# Patient Record
Sex: MALE | Race: WHITE | NOT HISPANIC OR LATINO | Employment: OTHER | URBAN - METROPOLITAN AREA
[De-identification: names, ages, dates, MRNs, and addresses within clinical notes are randomized per-mention and may not be internally consistent; named-entity substitution may affect disease eponyms.]

---

## 2018-02-12 ENCOUNTER — HOSPITAL ENCOUNTER (OUTPATIENT)
Dept: GENERAL RADIOLOGY | Facility: HOSPITAL | Age: 63
Discharge: HOME | End: 2018-02-12
Attending: INTERNAL MEDICINE | Admitting: INTERNAL MEDICINE

## 2018-02-12 ENCOUNTER — TRANSCRIBE ORDERS (OUTPATIENT)
Dept: ADMINISTRATIVE | Facility: HOSPITAL | Age: 63
End: 2018-02-12

## 2018-02-12 DIAGNOSIS — M25.551 RIGHT HIP PAIN: ICD-10-CM

## 2018-02-12 DIAGNOSIS — M25.551 RIGHT HIP PAIN: Primary | ICD-10-CM

## 2018-02-12 PROCEDURE — 73502 X-RAY EXAM HIP UNI 2-3 VIEWS: CPT

## 2020-02-05 PROBLEM — Z96.641 STATUS POST RIGHT HIP REPLACEMENT: Status: ACTIVE | Noted: 2018-03-28

## 2020-02-05 PROBLEM — M16.11 PRIMARY OSTEOARTHRITIS OF RIGHT HIP: Status: ACTIVE | Noted: 2018-03-16

## 2020-07-07 ENCOUNTER — HOSPITAL ENCOUNTER (OUTPATIENT)
Dept: GENERAL RADIOLOGY | Facility: HOSPITAL | Age: 65
Discharge: HOME | End: 2020-07-07
Admitting: UROLOGY

## 2020-07-07 ENCOUNTER — TRANSCRIBE ORDERS (OUTPATIENT)
Dept: ADMINISTRATIVE | Facility: HOSPITAL | Age: 65
End: 2020-07-07

## 2020-07-07 DIAGNOSIS — N13.9 URINARY OBSTRUCTION: ICD-10-CM

## 2020-07-07 DIAGNOSIS — N13.9 URINARY OBSTRUCTION: Primary | ICD-10-CM

## 2020-07-07 PROCEDURE — 71046 X-RAY EXAM CHEST 2 VIEWS: CPT

## 2021-01-13 ENCOUNTER — TELEPHONE (OUTPATIENT)
Dept: INTERNAL MEDICINE | Facility: CLINIC | Age: 66
End: 2021-01-13

## 2021-01-13 NOTE — TELEPHONE ENCOUNTER
PATIENT IS IMMUNE COMPROMISED AND WOULD LIKE TO KNOW IF HE NEEDS A NOTE TO RECEIVE THE COVID VACCINE @ [facility]?    PLEASE ADVISE PATIENT @  [phone]

## 2021-03-05 RX ORDER — SILDENAFIL CITRATE 20 MG/1
TABLET ORAL
Qty: 90 TABLET | Refills: 4 | Status: SHIPPED | OUTPATIENT
Start: 2021-03-05 | End: 2022-04-26 | Stop reason: SDUPTHER

## 2021-08-09 ENCOUNTER — TELEPHONE (OUTPATIENT)
Dept: INTERNAL MEDICINE | Facility: CLINIC | Age: 66
End: 2021-08-09

## 2021-08-09 NOTE — TELEPHONE ENCOUNTER
Caller: Roney Trinidad    Relationship: Self    Best call back number:  [phone] (M)    Medication needed:   Requested Prescriptions      No prescriptions requested or ordered in this encounter    LORAZEPAM    When do you need the refill by: 08/09/21    What additional details did the patient provide when requesting the medication: PATIENT STATE SWILL BE TRAVELING    PATIENT ASLO STATES WANTS TO VERIFY IF TEST RESULTS FROM DR VERDUGO HAS BEEN SENT OVER   Does the patient have less than a 3 day supply:  [x] Yes  [] No    What is the patient's preferred pharmacy: [id number] [place], [place] - [address] [address] AT [facility] [address] - [phone] ph - [phone] FX

## 2021-08-16 ENCOUNTER — HOSPITAL ENCOUNTER (OUTPATIENT)
Dept: GENERAL RADIOLOGY | Facility: HOSPITAL | Age: 66
Discharge: HOME | End: 2021-08-16
Admitting: UROLOGY

## 2021-08-16 ENCOUNTER — TRANSCRIBE ORDERS (OUTPATIENT)
Dept: ADMINISTRATIVE | Facility: HOSPITAL | Age: 66
End: 2021-08-16

## 2021-08-16 DIAGNOSIS — R31.0 GROSS HEMATURIA: ICD-10-CM

## 2021-08-16 DIAGNOSIS — R31.29 MICROSCOPIC HEMATURIA: ICD-10-CM

## 2021-08-16 DIAGNOSIS — R31.0 GROSS HEMATURIA: Primary | ICD-10-CM

## 2021-08-16 PROCEDURE — 74018 RADEX ABDOMEN 1 VIEW: CPT

## 2021-08-25 ENCOUNTER — OFFICE VISIT (OUTPATIENT)
Dept: INTERNAL MEDICINE | Facility: CLINIC | Age: 66
End: 2021-08-25

## 2021-08-25 VITALS
HEART RATE: 67 BPM | DIASTOLIC BLOOD PRESSURE: 80 MMHG | OXYGEN SATURATION: 98 % | RESPIRATION RATE: 16 BRPM | TEMPERATURE: 97.7 F | WEIGHT: 195 LBS | SYSTOLIC BLOOD PRESSURE: 142 MMHG

## 2021-08-25 DIAGNOSIS — F41.8 SITUATIONAL ANXIETY: Primary | ICD-10-CM

## 2021-08-25 DIAGNOSIS — E78.2 MIXED HYPERLIPIDEMIA: ICD-10-CM

## 2021-08-25 DIAGNOSIS — C62.90 SEMINOMA (HCC): ICD-10-CM

## 2021-08-25 PROCEDURE — 99214 OFFICE O/P EST MOD 30 MIN: CPT | Performed by: INTERNAL MEDICINE

## 2021-08-25 RX ORDER — IBUPROFEN 800 MG/1
800 TABLET ORAL
COMMUNITY
End: 2022-10-21

## 2021-08-25 RX ORDER — ASPIRIN 81 MG/1
81 TABLET ORAL DAILY
COMMUNITY

## 2021-08-25 RX ORDER — TESTOSTERONE 12.5 MG/1.25G
GEL TOPICAL DAILY
COMMUNITY
End: 2022-07-22

## 2021-08-25 RX ORDER — LORAZEPAM 0.5 MG/1
0.5 TABLET ORAL EVERY 8 HOURS PRN
Qty: 30 TABLET | Refills: 0 | Status: SHIPPED | OUTPATIENT
Start: 2021-08-25 | End: 2022-07-21 | Stop reason: SDUPTHER

## 2021-08-25 RX ORDER — CHLORAL HYDRATE 500 MG
CAPSULE ORAL
COMMUNITY
End: 2022-10-21

## 2021-08-25 RX ORDER — FEXOFENADINE HCL AND PSEUDOEPHEDRINE HCI 180; 240 MG/1; MG/1
1 TABLET, EXTENDED RELEASE ORAL DAILY
COMMUNITY

## 2021-08-25 RX ORDER — MELOXICAM 15 MG/1
15 TABLET ORAL DAILY
COMMUNITY
End: 2022-10-21 | Stop reason: SDUPTHER

## 2021-08-25 NOTE — PROGRESS NOTES
Chief Complaint  Hyperlipidemia and Hypertension    Jovon Trinidad presents to [facility]  History of Present Illness  Opioids doing well.  He saw urology in started talking about injections of testosterone for low testosterone level.  He has been on topical for quite a while.  He has been less active is gained 30 pounds over Covid and not exercising as much.  He has been off his diet.  Overall he is traveling more retired, he came back here originally for Ativan refill for situational anxiety particularly flying  Objective   Vital Signs:   /80 (BP Location: Left arm, Patient Position: Sitting, Cuff Size: Large Adult)   Pulse 67   Temp 97.7 °F (36.5 °C) (Temporal)   Resp 16   Wt 88.5 kg (195 lb)   SpO2 98%     Physical Exam  Constitutional:       Appearance: Normal appearance.   HENT:      Head: Normocephalic and atraumatic.      Nose: Nose normal.   Eyes:      Pupils: Pupils are equal, round, and reactive to light.   Cardiovascular:      Rate and Rhythm: Normal rate and regular rhythm.      Heart sounds: No murmur heard.   No friction rub. No gallop.    Pulmonary:      Effort: Pulmonary effort is normal.      Breath sounds: Normal breath sounds.   Neurological:      General: No focal deficit present.      Mental Status: He is alert and oriented to person, place, and time.   Psychiatric:         Attention and Perception: He does not perceive auditory or visual hallucinations.         Mood and Affect: Mood normal.         Speech: Speech normal.         Behavior: Behavior normal.         Thought Content: Thought content normal.         Cognition and Memory: Cognition normal.         Judgment: Judgment normal.        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  I think I be a little reluctant to do IM testosterone replacement just  due to his history but it sounds they are moving him in that direction.  I would probably think twice about that and maybe talk to him a little further about it.  He is coming in for a physical next month or 2 so we can check the labs then.  I strongly encouraged plant-based or paleo diet, start exercising particularly weight-based exercising and see if that helps too.  His situational anxiety, refilled his Ativan which he uses very sparingly.  No concerns for any misuse    Follow Up   Return in about 2 months (around 10/25/2021) for Annual physical.  Patient was given instructions and counseling regarding his condition or for health maintenance advice. Please see specific information pulled into the AVS if appropriate.

## 2021-10-04 ENCOUNTER — OFFICE VISIT (OUTPATIENT)
Dept: INTERNAL MEDICINE | Facility: CLINIC | Age: 66
End: 2021-10-04

## 2021-10-04 VITALS
BODY MASS INDEX: 32.32 KG/M2 | OXYGEN SATURATION: 97 % | HEART RATE: 63 BPM | RESPIRATION RATE: 16 BRPM | SYSTOLIC BLOOD PRESSURE: 158 MMHG | TEMPERATURE: 97 F | WEIGHT: 194 LBS | HEIGHT: 65 IN | DIASTOLIC BLOOD PRESSURE: 88 MMHG

## 2021-10-04 DIAGNOSIS — F41.8 SITUATIONAL ANXIETY: ICD-10-CM

## 2021-10-04 DIAGNOSIS — Z23 ENCOUNTER FOR IMMUNIZATION: ICD-10-CM

## 2021-10-04 DIAGNOSIS — Z00.00 ROUTINE GENERAL MEDICAL EXAMINATION AT A HEALTH CARE FACILITY: Primary | ICD-10-CM

## 2021-10-04 DIAGNOSIS — I10 ESSENTIAL (PRIMARY) HYPERTENSION: ICD-10-CM

## 2021-10-04 PROCEDURE — 90471 IMMUNIZATION ADMIN: CPT | Performed by: INTERNAL MEDICINE

## 2021-10-04 PROCEDURE — 90686 IIV4 VACC NO PRSV 0.5 ML IM: CPT | Performed by: INTERNAL MEDICINE

## 2021-10-04 PROCEDURE — 99396 PREV VISIT EST AGE 40-64: CPT | Performed by: INTERNAL MEDICINE

## 2021-10-04 RX ORDER — TESTOSTERONE CYPIONATE 200 MG/ML
INJECTION, SOLUTION INTRAMUSCULAR
COMMUNITY
Start: 2021-08-17

## 2021-10-04 RX ORDER — SYRINGE WITH NEEDLE, 1 ML 25GX5/8"
SYRINGE, EMPTY DISPOSABLE MISCELLANEOUS
COMMUNITY
Start: 2021-08-18

## 2021-10-04 NOTE — PROGRESS NOTES
"Chief Complaint   Patient presents with   • Annual Exam       Subjective   Roney Trinidad is a 66 y.o. male here for   Chief Complaint   Patient presents with   • Annual Exam   .    History of Present Illness   Here for the physical today.  Overall doing very well.  No specific concerns.  We talked about diet and exercise habits.  Discussed prevention recommendations.  The following portions of the patient's history were reviewed and updated as appropriate: allergies, current medications, past family history, past medical history, past social history, past surgical history, and problem list.    Review of Systems   Constitutional: Negative.    HENT: Negative for congestion, drooling, facial swelling, hearing loss, mouth sores, rhinorrhea, sinus pressure, sore throat, swollen glands, tinnitus and trouble swallowing.    Eyes: Negative.    Respiratory: Negative.    Cardiovascular: Negative.    Gastrointestinal: Negative.    Endocrine: Negative.    Genitourinary: Negative for difficulty urinating, dysuria, flank pain, frequency, hematuria and urgency.   Musculoskeletal: Negative for arthralgias and myalgias.   Skin: Negative for color change, dry skin, rash and bruise.   Neurological: Negative for dizziness, tremors, weakness, headache, memory problem and confusion.   Hematological: Negative for adenopathy. Does not bruise/bleed easily.   Psychiatric/Behavioral: Negative for agitation, behavioral problems, depressed mood and stress. The patient is not nervous/anxious.        Body mass index is 32.28 kg/m².   Vitals:    10/04/21 1305   BP: 158/88   Pulse: 63   Resp: 16   Temp: 97 °F (36.1 °C)   SpO2: 97%   Weight: 88 kg (194 lb)   Height: 165.1 cm (65\")        Objective   Physical Exam  Vitals and nursing note reviewed.   Constitutional:       Appearance: Normal appearance.   HENT:      Head: Normocephalic and atraumatic.   Cardiovascular:      Rate and Rhythm: Normal rate and regular rhythm.   Pulmonary:      Effort: " Pulmonary effort is normal.      Breath sounds: Normal breath sounds.   Abdominal:      General: Abdomen is flat.      Palpations: Abdomen is soft.   Musculoskeletal:         General: No swelling or deformity.      Cervical back: Neck supple.      Right lower leg: No edema.      Left lower leg: No edema.   Skin:     General: Skin is warm.      Capillary Refill: Capillary refill takes less than 2 seconds.      Findings: No rash.   Neurological:      General: No focal deficit present.      Mental Status: He is alert and oriented to person, place, and time.         No results found for: CBCDIF, CMP, LIPIDINTERP, HGBA1C, TSH, [id number], PSA, TESTOSTERONE     Health Maintenance   Topic Date Due   • ZOSTER VACCINE (2 of 2) 12/30/2019   • LIPID PANEL  Never done   • INFLUENZA VACCINE  10/01/2021   • TDAP/TD VACCINES (2 - Td or Tdap) 07/09/2029        Assessment/Plan   Problems Addressed this Visit        Cardiac and Vasculature    Essential (primary) hypertension    Relevant Orders    Hepatitis C Antibody    Urinalysis With Microscopic - Urine, Clean Catch    CBC & Differential    Comprehensive Metabolic Panel    Hemoglobin A1c    TSH    Testosterone    Lipid Panel With / Chol / HDL Ratio      Other Visit Diagnoses     Routine general medical examination at a health care facility    -  Primary    Relevant Orders    Hepatitis C Antibody    Urinalysis With Microscopic - Urine, Clean Catch    CBC & Differential    Comprehensive Metabolic Panel    Hemoglobin A1c    TSH    Testosterone    Lipid Panel With / Chol / HDL Ratio    Situational anxiety        Relevant Orders    Hepatitis C Antibody    Urinalysis With Microscopic - Urine, Clean Catch    CBC & Differential    Comprehensive Metabolic Panel    Hemoglobin A1c    TSH    Testosterone    Lipid Panel With / Chol / HDL Ratio    Encounter for immunization        Relevant Orders    FluLaval/Fluarix >6 Months (2461-7637)      Diagnoses       Codes Comments    Routine general  medical examination at a health care facility    -  Primary ICD-10-CM: Z00.00  ICD-9-CM: V70.0     Situational anxiety     ICD-10-CM: F41.8  ICD-9-CM: 300.09     Essential (primary) hypertension     ICD-10-CM: I10  ICD-9-CM: 401.9     Encounter for immunization     ICD-10-CM: Z23  ICD-9-CM: V03.89           Orders Placed This Encounter   Procedures   • FluLaval/Fluarix >6 Months (7314-9154)      Overall he is doing well.  Mild blood pressure elevation which will need to monitor.  Add medication if persistently elevated.  He has follow-up with cardiology for repeat echocardiogram due to a dilated thoracic aorta.  I suspect is not likely to change in that interval but if it looks stable at this point could at least go to yearly checks.  Reevaluate blood pressure there and start an ARB if elevated.  History of seminoma followed by urology closely and they did his PSA  Recheck lab work on follow-up pending results.  We refilled his medications last time  Depression screen reviewed with patient and negative.  Advised behavioral modifications including dietary changes and increased exercise with goal of healthy weight and lifestyle.   Will make follow up plans based on lab results as above.          Return in about 6 months (around 4/4/2022).

## 2021-10-05 LAB
ALBUMIN SERPL-MCNC: 5.3 G/DL (ref 3.5–5.2)
ALBUMIN/GLOB SERPL: 2.7 G/DL
ALP SERPL-CCNC: 73 U/L (ref 39–117)
ALT SERPL-CCNC: 28 U/L (ref 1–41)
APPEARANCE UR: CLEAR
AST SERPL-CCNC: 30 U/L (ref 1–40)
BACTERIA #/AREA URNS HPF: NORMAL /HPF
BASOPHILS # BLD AUTO: 0.05 10*3/MM3 (ref 0–0.2)
BASOPHILS NFR BLD AUTO: 0.7 % (ref 0–1.5)
BILIRUB SERPL-MCNC: 0.7 MG/DL (ref 0–1.2)
BILIRUB UR QL STRIP: NEGATIVE
BUN SERPL-MCNC: 12 MG/DL (ref 8–23)
BUN/CREAT SERPL: 12.2 (ref 7–25)
CALCIUM SERPL-MCNC: 10 MG/DL (ref 8.6–10.5)
CASTS URNS MICRO: NORMAL
CHLORIDE SERPL-SCNC: 97 MMOL/L (ref 98–107)
CHOLEST SERPL-MCNC: 252 MG/DL (ref 0–200)
CHOLEST/HDLC SERPL: 5.04 {RATIO}
CO2 SERPL-SCNC: 25.7 MMOL/L (ref 22–29)
COLOR UR: YELLOW
CREAT SERPL-MCNC: 0.98 MG/DL (ref 0.76–1.27)
EOSINOPHIL # BLD AUTO: 0.11 10*3/MM3 (ref 0–0.4)
EOSINOPHIL NFR BLD AUTO: 1.5 % (ref 0.3–6.2)
EPI CELLS #/AREA URNS HPF: NORMAL /HPF
ERYTHROCYTE [DISTWIDTH] IN BLOOD BY AUTOMATED COUNT: 13.4 % (ref 12.3–15.4)
GLOBULIN SER CALC-MCNC: 2 GM/DL
GLUCOSE SERPL-MCNC: 88 MG/DL (ref 65–99)
GLUCOSE UR QL: NEGATIVE
HBA1C MFR BLD: 5.7 % (ref 4.8–5.6)
HCT VFR BLD AUTO: 47.8 % (ref 37.5–51)
HCV AB S/CO SERPL IA: <0.1 S/CO RATIO (ref 0–0.9)
HDLC SERPL-MCNC: 50 MG/DL (ref 40–60)
HGB BLD-MCNC: 15.7 G/DL (ref 13–17.7)
HGB UR QL STRIP: NEGATIVE
IMM GRANULOCYTES # BLD AUTO: 0.02 10*3/MM3 (ref 0–0.05)
IMM GRANULOCYTES NFR BLD AUTO: 0.3 % (ref 0–0.5)
KETONES UR QL STRIP: NEGATIVE
LDLC SERPL CALC-MCNC: 178 MG/DL (ref 0–100)
LEUKOCYTE ESTERASE UR QL STRIP: NEGATIVE
LYMPHOCYTES # BLD AUTO: 1.65 10*3/MM3 (ref 0.7–3.1)
LYMPHOCYTES NFR BLD AUTO: 21.9 % (ref 19.6–45.3)
MCH RBC QN AUTO: 29.1 PG (ref 26.6–33)
MCHC RBC AUTO-ENTMCNC: 32.8 G/DL (ref 31.5–35.7)
MCV RBC AUTO: 88.5 FL (ref 79–97)
MONOCYTES # BLD AUTO: 0.68 10*3/MM3 (ref 0.1–0.9)
MONOCYTES NFR BLD AUTO: 9 % (ref 5–12)
NEUTROPHILS # BLD AUTO: 5.01 10*3/MM3 (ref 1.7–7)
NEUTROPHILS NFR BLD AUTO: 66.6 % (ref 42.7–76)
NITRITE UR QL STRIP: NEGATIVE
NRBC BLD AUTO-RTO: 0 /100 WBC (ref 0–0.2)
PH UR STRIP: 6.5 [PH] (ref 5–8)
PLATELET # BLD AUTO: 264 10*3/MM3 (ref 140–450)
POTASSIUM SERPL-SCNC: 4.5 MMOL/L (ref 3.5–5.2)
PROT SERPL-MCNC: 7.3 G/DL (ref 6–8.5)
PROT UR QL STRIP: NEGATIVE
RBC # BLD AUTO: 5.4 10*6/MM3 (ref 4.14–5.8)
RBC #/AREA URNS HPF: NORMAL /HPF
SODIUM SERPL-SCNC: 138 MMOL/L (ref 136–145)
SP GR UR: 1.01 (ref 1–1.03)
TESTOST SERPL-MCNC: 722 NG/DL (ref 264–916)
TRIGL SERPL-MCNC: 133 MG/DL (ref 0–150)
TSH SERPL DL<=0.005 MIU/L-ACNC: 1.56 UIU/ML (ref 0.27–4.2)
UROBILINOGEN UR STRIP-MCNC: NORMAL MG/DL
VLDLC SERPL CALC-MCNC: 24 MG/DL (ref 5–40)
WBC # BLD AUTO: 7.52 10*3/MM3 (ref 3.4–10.8)
WBC #/AREA URNS HPF: NORMAL /HPF

## 2021-10-05 RX ORDER — ROSUVASTATIN CALCIUM 10 MG/1
10 TABLET, COATED ORAL DAILY
Qty: 90 TABLET | Refills: 0 | Status: SHIPPED | OUTPATIENT
Start: 2021-10-05 | End: 2022-01-16

## 2021-11-30 ENCOUNTER — TELEPHONE (OUTPATIENT)
Dept: INTERNAL MEDICINE | Facility: CLINIC | Age: 66
End: 2021-11-30

## 2021-11-30 RX ORDER — SCOLOPAMINE TRANSDERMAL SYSTEM 1 MG/1
1 PATCH, EXTENDED RELEASE TRANSDERMAL
Qty: 4 PATCH | Refills: 2 | Status: SHIPPED | OUTPATIENT
Start: 2021-11-30

## 2021-12-06 ENCOUNTER — TELEMEDICINE (OUTPATIENT)
Dept: FAMILY MEDICINE CLINIC | Facility: TELEHEALTH | Age: 66
End: 2021-12-06

## 2021-12-06 ENCOUNTER — TRANSCRIBE ORDERS (OUTPATIENT)
Dept: ADMINISTRATIVE | Facility: HOSPITAL | Age: 66
End: 2021-12-06

## 2021-12-06 ENCOUNTER — TELEPHONE (OUTPATIENT)
Dept: INTERNAL MEDICINE | Facility: CLINIC | Age: 66
End: 2021-12-06

## 2021-12-06 DIAGNOSIS — U07.1 COVID-19: Primary | ICD-10-CM

## 2021-12-06 DIAGNOSIS — U07.1 CLINICAL DIAGNOSIS OF SEVERE ACUTE RESPIRATORY SYNDROME CORONAVIRUS 2 (SARS-COV-2) DISEASE: Primary | ICD-10-CM

## 2021-12-06 PROBLEM — E78.2 MIXED HYPERLIPIDEMIA: Status: ACTIVE | Noted: 2021-02-18

## 2021-12-06 PROBLEM — Z96.642 STATUS POST LEFT HIP REPLACEMENT: Status: ACTIVE | Noted: 2018-03-28

## 2021-12-06 PROBLEM — C62.90 SEMINOMA: Status: ACTIVE | Noted: 2021-02-18

## 2021-12-06 PROBLEM — I10 ESSENTIAL (PRIMARY) HYPERTENSION: Status: ACTIVE | Noted: 2021-02-18

## 2021-12-06 PROCEDURE — 99203 OFFICE O/P NEW LOW 30 MIN: CPT | Performed by: NURSE PRACTITIONER

## 2021-12-06 RX ORDER — DIPHENHYDRAMINE HYDROCHLORIDE 50 MG/ML
50 INJECTION INTRAMUSCULAR; INTRAVENOUS ONCE AS NEEDED
OUTPATIENT
Start: 2021-12-07

## 2021-12-06 RX ORDER — SODIUM CHLORIDE 9 MG/ML
30 INJECTION, SOLUTION INTRAVENOUS ONCE
Status: CANCELLED | OUTPATIENT
Start: 2021-12-07

## 2021-12-06 RX ORDER — DIPHENHYDRAMINE HCL 25 MG
50 TABLET ORAL ONCE AS NEEDED
OUTPATIENT
Start: 2021-12-07

## 2021-12-06 RX ORDER — EPINEPHRINE 1 MG/ML
0.3 INJECTION, SOLUTION, CONCENTRATE INTRAVENOUS AS NEEDED
OUTPATIENT
Start: 2021-12-07

## 2021-12-06 RX ORDER — METHYLPREDNISOLONE SODIUM SUCCINATE 125 MG/2ML
125 INJECTION, POWDER, LYOPHILIZED, FOR SOLUTION INTRAMUSCULAR; INTRAVENOUS AS NEEDED
OUTPATIENT
Start: 2021-12-07

## 2021-12-06 NOTE — PATIENT INSTRUCTIONS
Bamlanivimab Solution for Injection  What is this medicine?  BAMLANIVIMAB (bam felecia iv' i mab) is a monoclonal antibody. It is used with another monoclonal antibody to treat COVID-19 in patients who are not hospitalized. It is for patients at risk of getting severe symptoms of COVID-19. It may decrease the risk of developing severe symptoms of COVID-19. It may also decrease the chance of going to the hospital. This medicine is not approved by the FDA. The FDA has authorized emergency use of this medicine during the COVID-19 pandemic.  This medicine may be used for other purposes; ask your health care provider or pharmacist if you have questions.  What should I tell my health care provider before I take this medicine?  They need to know if you have any of these conditions:  · any allergies  · any serious illness  · have received a COVID-19 vaccine  · liver disease  · an unusual or allergic reaction to bamlanivimab, etesevimab, other medicines, foods, dyes, or preservatives  · pregnant or trying to get pregnant  · breast-feeding  How should I use this medicine?  This medicine is for infusion into a vein. It is given by a health care professional in a clinic setting.  Talk to your pediatrician regarding the use of this medicine in children. While this drug may be prescribed for children as young as 12 years of age for selected conditions, precautions do apply.  Overdosage: If you think you have taken too much of this medicine contact a poison control center or emergency room at once.  NOTE: This medicine is only for you. Do not share this medicine with others.  What if I miss a dose?  This does not apply.  What may interact with this medicine?  · COVID-19 vaccines  This list may not describe all possible interactions. Give your health care provider a list of all the medicines, herbs, non-prescription drugs, or dietary supplements you use. Also tell them if you smoke, drink alcohol, or use illegal drugs. Some items may  interact with your medicine.  What should I watch for while using this medicine?  Your condition will be monitored carefully while you are receiving this medicine. Visit your health care provider for regular checkups. Tell your health care provider if your symptoms do not start to get better or if they get worse.  After receiving this medicine, wait at least 90 days before getting the COVID-19 vaccine. If you have already received your first dose of the COVID-19 vaccine, wait at least 90 days after getting this medicine before getting your second dose of vaccine.  What side effects may I notice from receiving this medicine?  Side effects that you should report to your doctor or health care professional as soon as possible:  · allergic reactions (skin rash, itching or hives, swelling of the face, lips, or tongue)  · infusion-related reactions (chest pain or chest tightness, chills, fever, flushing, stomach pain, trouble breathing)  Side effects that usually do not require medical attention (report these to your doctor or health care professional if they continue or are bothersome):  · dizziness  · nausea  · pain, redness, or irritation at site where injected  This list may not describe all possible side effects. Call your doctor for medical advice about side effects. You may report side effects to FDA at 5-128-ALP-8710.  Where should I keep my medicine?  This drug is given in a clinic and will not be stored at home.  NOTE: This sheet is a summary. It may not cover all possible information. If you have questions about this medicine, talk to your doctor, pharmacist, or health care provider.  © 2021 Elsevier/Gold Standard (2021-04-19 13:40:45)    Etesevimab Solution for Injection  What is this medicine?  ETESEVIMAB (e gay e vi mab) is a monoclonal antibody. It is used with another monoclonal antibody to treat COVID-19 in patients who are not hospitalized. It is for patients at risk of getting severe symptoms of  COVID-19. It may decrease the risk of developing severe symptoms of COVID-19. It may also decrease the chance of going to the hospital. This medicine is not approved by the FDA. The FDA has authorized emergency use of this medicine during the COVID-19 pandemic.  This medicine may be used for other purposes; ask your health care provider or pharmacist if you have questions.  What should I tell my health care provider before I take this medicine?  They need to know if you have any of these conditions:  · any allergies  · any serious illness  · have received a COVID-19 vaccine  · liver disease  · an unusual or allergic reaction to etesevimab, bamlanivimab, other medicines, foods, dyes, or preservatives  · pregnant or trying to get pregnant  · breast-feeding  How should I use this medicine?  This medicine is for infusion into a vein. It is given by a health care professional in a clinic setting.  Talk to your pediatrician regarding the use of this medicine in children. While this drug may be prescribed for children as young as 12 years of age for selected conditions, precautions do apply.  Overdosage: If you think you have taken too much of this medicine contact a poison control center or emergency room at once.  NOTE: This medicine is only for you. Do not share this medicine with others.  What if I miss a dose?  This does not apply.  What may interact with this medicine?  · COVID-19 vaccines  This list may not describe all possible interactions. Give your health care provider a list of all the medicines, herbs, non-prescription drugs, or dietary supplements you use. Also tell them if you smoke, drink alcohol, or use illegal drugs. Some items may interact with your medicine.  What should I watch for while using this medicine?  Your condition will be monitored carefully while you are receiving this medicine. Visit your health care provider for regular checkups. Tell your health care provider if your symptoms do not start  to get better or if they get worse.  After receiving this medicine, wait at least 90 days before getting the COVID-19 vaccine. If you have already received your first dose of the COVID-19 vaccine, wait at least 90 days after getting this medicine before getting your second dose of vaccine.  What side effects may I notice from receiving this medicine?  Side effects that you should report to your doctor or health care professional as soon as possible:  · allergic reactions (skin rash, itching or hives, swelling of the face, lips, or tongue)  · infusion-related reactions (chest pain or chest tightness, chills, fever, flushing, stomach pain, trouble breathing)  Side effects that usually do not require medical attention (report these to your doctor or health care professional if they continue or are bothersome):  · dizziness  · nausea  · pain, redness, or irritation at site where injected  This list may not describe all possible side effects. Call your doctor for medical advice about side effects. You may report side effects to FDA at 0-921-MMB-5245.  Where should I keep my medicine?  This drug is given in a clinic and will not be stored at home.  NOTE: This sheet is a summary. It may not cover all possible information. If you have questions about this medicine, talk to your doctor, pharmacist, or health care provider.  © 2021 Elsevier/Gold Standard (2021-02-18 09:59:39)

## 2021-12-06 NOTE — PROGRESS NOTES
Mode of Visit: Video  Location of patient: home  You have chosen to receive care through a telehealth visit.  The patient has signed the video visit consent form.  The visit included audio and video interaction. No technical issues occurred during this visit.     Chief Complaint  covid-19 diagnosis (Monoclonal antibodies)    Subjective          Roney Trinidad presents to [facility]  Covid-19 diagnosed yesterday after testing positive on 12/4/2021. He had symptom onset on 12/4/2021. He is symptomatic and just wishes to feel better.    Objective   Vital Signs:   There were no vitals taken for this visit.    Physical Exam   Constitutional: He appears well-developed and well-nourished. No distress.   Pulmonary/Chest: Effort normal.   Neurological: He is alert.   Psychiatric: He has a normal mood and affect.     Result Review :                 Assessment and Plan    Diagnoses and all orders for this visit:    1. COVID-19 (Primary)      See Therapy Plan        I spent 20 minutes caring for Roney on this date of service. This time includes time spent by me in the following activities:preparing for the visit, obtaining and/or reviewing a separately obtained history, performing a medically appropriate examination and/or evaluation , counseling and educating the patient/family/caregiver, ordering medications, tests, or procedures, and documenting information in the medical record  Follow Up   No follow-ups on file.  Patient was given instructions and counseling regarding his condition or for health maintenance advice. Please see specific information pulled into the AVS if appropriate.

## 2021-12-06 NOTE — TELEPHONE ENCOUNTER
Caller: Roney Trinidad    Relationship: Self    Best call back number: [phone]     What is the best time to reach you: ANYTIME    Who are you requesting to speak with MA OR DOCTOR        What was the call regarding: PATIENT IS CALLING IN HE STATES HE TESTED POSITIVE FOR COVID YESTERDAY AND HAS FEVER AND NOT FEELING WELL AND WOULD LIKE CALLBACK ASAP TO FIND OUT WHAT HE NEEDS TO BE TAKING AND WHAT HE SHOULD BE DOING.    Do you require a callback: YES

## 2021-12-07 ENCOUNTER — HOSPITAL ENCOUNTER (OUTPATIENT)
Dept: INFUSION THERAPY | Facility: HOSPITAL | Age: 66
Discharge: HOME | End: 2021-12-07
Admitting: NURSE PRACTITIONER

## 2021-12-07 VITALS
RESPIRATION RATE: 16 BRPM | TEMPERATURE: 98.7 F | OXYGEN SATURATION: 97 % | DIASTOLIC BLOOD PRESSURE: 80 MMHG | HEART RATE: 56 BPM | SYSTOLIC BLOOD PRESSURE: 148 MMHG

## 2021-12-07 DIAGNOSIS — U07.1 CLINICAL DIAGNOSIS OF SEVERE ACUTE RESPIRATORY SYNDROME CORONAVIRUS 2 (SARS-COV-2) DISEASE: Primary | ICD-10-CM

## 2021-12-07 PROCEDURE — 25010000002 INJECTION, CASIRIVIMAB AND IMDEVIMAB, 1200 MG: Performed by: NURSE PRACTITIONER

## 2021-12-07 PROCEDURE — M0243 CASIRIVI AND IMDEVI INFUSION: HCPCS | Performed by: NURSE PRACTITIONER

## 2021-12-07 RX ORDER — EPINEPHRINE 1 MG/ML
0.3 INJECTION, SOLUTION INTRAMUSCULAR; SUBCUTANEOUS AS NEEDED
OUTPATIENT
Start: 2021-12-07

## 2021-12-07 RX ORDER — METHYLPREDNISOLONE SODIUM SUCCINATE 125 MG/2ML
125 INJECTION, POWDER, LYOPHILIZED, FOR SOLUTION INTRAMUSCULAR; INTRAVENOUS AS NEEDED
OUTPATIENT
Start: 2021-12-07

## 2021-12-07 RX ORDER — DIPHENHYDRAMINE HYDROCHLORIDE 50 MG/ML
50 INJECTION INTRAMUSCULAR; INTRAVENOUS ONCE AS NEEDED
OUTPATIENT
Start: 2021-12-07

## 2021-12-07 RX ORDER — EPINEPHRINE 1 MG/ML
0.3 INJECTION, SOLUTION, CONCENTRATE INTRAVENOUS AS NEEDED
Status: DISCONTINUED | OUTPATIENT
Start: 2021-12-07 | End: 2021-12-09 | Stop reason: HOSPADM

## 2021-12-07 RX ORDER — SODIUM CHLORIDE 9 MG/ML
30 INJECTION, SOLUTION INTRAVENOUS ONCE
Status: CANCELLED | OUTPATIENT
Start: 2021-12-07

## 2021-12-07 RX ORDER — SODIUM CHLORIDE 9 MG/ML
30 INJECTION, SOLUTION INTRAVENOUS ONCE
Status: DISCONTINUED | OUTPATIENT
Start: 2021-12-07 | End: 2021-12-07

## 2021-12-07 RX ORDER — DIPHENHYDRAMINE HCL 50 MG
50 CAPSULE ORAL ONCE AS NEEDED
Status: DISCONTINUED | OUTPATIENT
Start: 2021-12-07 | End: 2021-12-09 | Stop reason: HOSPADM

## 2021-12-07 RX ORDER — DIPHENHYDRAMINE HCL 50 MG
50 CAPSULE ORAL ONCE AS NEEDED
OUTPATIENT
Start: 2021-12-07

## 2021-12-07 RX ORDER — DIPHENHYDRAMINE HYDROCHLORIDE 50 MG/ML
50 INJECTION INTRAMUSCULAR; INTRAVENOUS ONCE AS NEEDED
Status: DISCONTINUED | OUTPATIENT
Start: 2021-12-07 | End: 2021-12-09 | Stop reason: HOSPADM

## 2021-12-07 RX ADMIN — CASIRIVIMAB AND IMDEVIMAB 300 MG: 600; 600 INJECTION, SOLUTION, CONCENTRATE INTRAVENOUS at 12:59

## 2021-12-07 NOTE — NURSING NOTE
Pt arrived to [facility].  EUA given, injection procedure explained, answered all questions.  Injections given, pt monitored for hour; no complications.  AVS given, pt d/c home

## 2022-01-16 RX ORDER — ROSUVASTATIN CALCIUM 10 MG/1
TABLET, COATED ORAL
Qty: 90 TABLET | Refills: 4 | Status: SHIPPED | OUTPATIENT
Start: 2022-01-16 | End: 2022-10-21 | Stop reason: SDUPTHER

## 2022-04-26 RX ORDER — SILDENAFIL CITRATE 20 MG/1
20 TABLET ORAL DAILY PRN
Qty: 90 TABLET | Refills: 4 | Status: SHIPPED | OUTPATIENT
Start: 2022-04-26

## 2022-05-16 ENCOUNTER — TELEPHONE (OUTPATIENT)
Dept: INTERNAL MEDICINE | Facility: CLINIC | Age: 67
End: 2022-05-16

## 2022-05-16 NOTE — TELEPHONE ENCOUNTER
Hub staff attempted to follow warm transfer process and was unsuccessful     Caller: Roney Trinidad    Relationship to patient: Self    Best call back number: [phone]     Patient is needing: PATIENT CALLED IN AND STATED HE HAS A CARDIOLOGIST APPOINTMENT ON 5/31/2022 AND THEY NEED LABS DRAWN PREVIOUS TO APPOINTMENT . PATIENT WANTS TO NOW IF DR. GONSALES COULD PUT IN ORDERS  THAT APPOINTMENT AND HIS ON  6/13/2022 . PATIENT ALSO WANTED TO LET DR. GONSALES KNOW HIS IS DOWN  POUNDS FROM 206 POUNDS . PLEASE ADVISE PATIENT .

## 2022-05-16 NOTE — TELEPHONE ENCOUNTER
I L/M AND LET THE PATIENT KNOW THAT DR. GONSALES WILL NOT BE BACK UNTIL AFTER MEMORIAL DAY AND WE WOULDN'T NECESSARILY KNOW WHAT THE CARDIOLOGIST WOULD WANT TO DRAW.  I DID LET HIM KNOW THAT WE COULD DO HIS LABS FOR THE APPT HE HAS WITH DR. GONSALES, THOUGH.

## 2022-06-08 ENCOUNTER — TELEPHONE (OUTPATIENT)
Dept: INTERNAL MEDICINE | Facility: CLINIC | Age: 67
End: 2022-06-08

## 2022-06-08 NOTE — TELEPHONE ENCOUNTER
Caller: Roney Trinidad    Relationship: Self    Best call back number: [phone] (H)    Who is your current provider: DR GONSALES    Who would you like your new provider to be:  DR. ORELLANA    What are your reasons for transferring care: IS LOOKING FOR THE TYPE OF CARE THAT DR. ORELLANA OFFERS.    Additional notes:           THANKS

## 2022-07-21 ENCOUNTER — OFFICE VISIT (OUTPATIENT)
Dept: FAMILY MEDICINE CLINIC | Facility: CLINIC | Age: 67
End: 2022-07-21

## 2022-07-21 VITALS
HEART RATE: 80 BPM | OXYGEN SATURATION: 98 % | TEMPERATURE: 97 F | DIASTOLIC BLOOD PRESSURE: 68 MMHG | HEIGHT: 65 IN | WEIGHT: 179 LBS | BODY MASS INDEX: 29.82 KG/M2 | SYSTOLIC BLOOD PRESSURE: 158 MMHG

## 2022-07-21 DIAGNOSIS — F41.0 PANIC ATTACKS: ICD-10-CM

## 2022-07-21 DIAGNOSIS — I10 ESSENTIAL (PRIMARY) HYPERTENSION: Primary | ICD-10-CM

## 2022-07-21 DIAGNOSIS — Z79.899 ENCOUNTER FOR LONG-TERM (CURRENT) USE OF MEDICATIONS: ICD-10-CM

## 2022-07-21 DIAGNOSIS — E78.2 MIXED HYPERLIPIDEMIA: ICD-10-CM

## 2022-07-21 PROBLEM — A60.01 HERPES SIMPLEX INFECTION OF PENIS: Status: ACTIVE | Noted: 2022-07-21

## 2022-07-21 PROCEDURE — 99214 OFFICE O/P EST MOD 30 MIN: CPT | Performed by: FAMILY MEDICINE

## 2022-07-21 RX ORDER — LORAZEPAM 0.5 MG/1
0.5 TABLET ORAL EVERY 8 HOURS PRN
Qty: 30 TABLET | Refills: 0 | Status: SHIPPED | OUTPATIENT
Start: 2022-07-21 | End: 2023-03-24

## 2022-07-21 RX ORDER — AMLODIPINE BESYLATE 10 MG/1
10 TABLET ORAL DAILY
COMMUNITY

## 2022-07-21 NOTE — PROGRESS NOTES
Chief Complaint  Establish Care (Fasting labs/7/11/130/70/07/13/130/70/07/20/130/65)    Subjective        Roney Trinidad presents to [facility]     History of Present Illness   He reports that his primary care has been with Dr. Mares since 1995 and would like to establish primary care closer to home as he has cardiology problems.    Seminoma cancer:   He reports that he has been in remission for 10 years and that he is still following with Dr. Vyas, Urology. He notes he has a CT scheduled today at 4:00 p.m. and he is scheduled to see Dr. Vyas on 07/28/2022 for the results. He notes that he switched from the testosterone gel to injecting testosterone in the last 2 or 3 months. He notes that his testosterone is up to 600 ng/dL.     Dilated ascending aorta:  The patient reports when he had an x-ray done they noticed that his heart was enlarged. He was sent to Dr Hurd a cardiologist who then ordered a CTA on 04/13/2021 and the heart looked healthy however his ascending aorta was 4.5 cm, slightly larger than normal. He notes that the plan is to monitor it. He notes that he was having some chest issues and a chemical stress test was done on 06/2022 and it was normal. He inquires about taking supplements and requests recommendations.    Hypertension  He notes that he has had borderline hypertension for several years, however he was diagnosed with hypertension in early 2022. He reports that in the last 3 months his blood pressure has elevated to 170 over 90 mm/Hg. Dr. Hurd, his cardiologist, prescribed low dose amlodipine, the dosage was increased from 5 mg to 10 mg recently. He denies noticing any changes after taking amlodipine except for weight loss, but he has been eating healthy and trying to loss weight. He notes that his wife monitors his blood pressure at home and his readings have been good lately.     Hyperlipidemia  He reports having borderline hyperlipidemia for several  years, however he was diagnosed with hyperlipidemia in 2022. He notes that Dr. Mares prescribed low dose cholesterol medication.     Overweight  He states that he weighed 206 pounds when first diagnosed with HTN and he has lost 27 pounds and plans more weight loss. He notes that he has always been active, however he has more energy since the weight loss. He inquires about living a healthy lifestyle and the possibility of shrinking the aorta. He states that he has a recumbent bicycle, a home gym and he has been exercising for a long time, however he has had bad dietary habits until recently. He states he has been doing smaller weights and higher repetitions. He states that he has been following the healthy keto diet and enjoying it. He confirms eating fats from quality meat, fiber, reducing starch and carbohydrates and making keto desserts with low sugar. He feels like he can sustain this change.    Situational anxiety  He reports an increase in situational anxiety as he has gotten older especially when in crowds, flying and going on amusement park rides. He was prescribed low dose lorazepam 0.5 mg which he takes infrequently. He states he has had the initial prescription bottle for a long time and cannot recall taking any in 2022. He requests a refill as they are going to [facility] in 12/2022.    Genital herpes  He reports that he got genital herpes when he was in college in 1976 and he takes acyclovir infrequently.     COVID-19  He reports getting COVID-19 in 12/2021 and feeling very sick. He reports being seen over [employer] and he qualified for the monoclonal therapy, and he had it the next day. He notes that his oxygen and heart rate were good. He reports the night after taking the monoclonal therapy he felt good and has been recovering since then. He notes the fatigue lasted 2 months and is now resolved however he still has had perversions of smell since then.    Preventative screening  He reports doing  "the Cologuard at home in 2021 through Dr. Mares. He notes having a colonoscopy in 2007 which revealed thinning of the lining and diverticulitis, no polyps. He confirms he is fasting and his last meal was yesterday. He confirms drinking 2 big glasses of water before his appointment. He denies taking CBD gummies. He notes that Dr. Vyas has done his annual prostate and physical examination. He confirms having Medicare and he is uncertain if he has done the Medicare wellness visit. He confirms he is currently taking vitamin D and zinc supplements daily.    Past history:   Bilateral total hip arthroplasties: total right hip arthroplasty was in 04/2018; and total left hip arthroplasty on 07/2018. Diverticulitis in 2008, Dr Nassar treated that, and it is resolved. He was diagnosed with seminoma cancer in 12/2010 and had right radical orchiectomy in 01/2011. He recovered well from the surgery and had radiation for 4 weeks, 5 days a week. He requests routine blood work as he has not had this done in a long time.    Family history: Both sisters have thyroid problems.    Objective   Vital Signs:  /68   Pulse 80   Temp 97 °F (36.1 °C)   Ht 165.1 cm (65\")   Wt 81.2 kg (179 lb)   SpO2 98%   BMI 29.79 kg/m²   Estimated body mass index is 29.79 kg/m² as calculated from the following:    Height as of this encounter: 165.1 cm (65\").    Weight as of this encounter: 81.2 kg (179 lb).    BMI is >= 25 and <30. (Overweight) The following options were offered after discussion;: exercise counseling/recommendations and nutrition counseling/recommendations      Physical Exam  Vitals and nursing note reviewed.   Constitutional:       Appearance: He is well-developed.   HENT:      Head: Normocephalic and atraumatic.      Right Ear: External ear normal.      Left Ear: External ear normal.      Nose: Nose normal.   Eyes:      General: No scleral icterus.     Conjunctiva/sclera: Conjunctivae normal.   Cardiovascular:      Rate and " Rhythm: Normal rate and regular rhythm.      Heart sounds: Normal heart sounds.   Pulmonary:      Effort: Pulmonary effort is normal.      Breath sounds: Normal breath sounds.   Musculoskeletal:      Cervical back: Normal range of motion and neck supple.   Lymphadenopathy:      Cervical: No cervical adenopathy.   Skin:     General: Skin is warm and dry.      Findings: No rash.   Neurological:      Mental Status: He is alert and oriented to person, place, and time.   Psychiatric:         Mood and Affect: Mood normal.         Behavior: Behavior normal.         Thought Content: Thought content normal.         Judgment: Judgment normal.        Result Review :  The following data was reviewed by: Anna Ragland DO on 07/21/2022:  Common labs    Common Labsle 10/4/21 10/4/21 10/4/21 10/4/21 7/21/22 7/21/22 7/21/22    1346 1346 1346 1346 1358 1358 1358   Glucose  88    87    BUN  12    17    Creatinine  0.98    1.06    eGFR Non  Am  77        eGFR African Am  93        Sodium  138    140    Potassium  4.5    4.7    Chloride  97 (A)    100    Calcium  10.0    9.6    Total Protein  7.3    7.2    Albumin  5.30 (A)    4.8    Total Bilirubin  0.7    0.7    Alkaline Phosphatase  73    69    AST (SGOT)  30    23    ALT (SGPT)  28    21    WBC 7.52    8.0     Hemoglobin 15.7    17.6     Hematocrit 47.8    52.1 (A)     Platelets 264    229     Total Cholesterol    252 (A)   135   Triglycerides    133   70   HDL Cholesterol    50   53   LDL Cholesterol     178 (A)   68   Hemoglobin A1C   5.70 (A)       (A) Abnormal value       Comments are available for some flowsheets but are not being displayed.           TSH    TSH 10/4/21 7/21/22   TSH 1.560 1.100                     Assessment and Plan   Diagnoses and all orders for this visit:    1. Essential (primary) hypertension (Primary)  -     Comprehensive Metabolic Panel    I advised patient that the increased testosterone could have been the etiology for elevated blood  pressure. I recommended that as long as he is feeling well, and blood pressure is managed they should keep the testosterone level no higher than currently. Should his blood pressure increase he should contact Dr. Vyas about possibly lowering testosterone dose.    2. Mixed hyperlipidemia  -     Lipid Panel    Advised patient that he could contact Dr. Hurd for advanced cholesterol testing. Also he may discuss recommended OTC suppliments with cardiology.  I recommend following a healthy lifestyle and discussed the benefits of cardiovascular exercise. I recommend he should increase his intake of oily fish like salmon.     3. Panic attacks  -     Refill of Lorazepam was given today.  Long term med use agreement was discussed and signed copy is in his chart. He consents to having the drug screen done.   - Drug Profile [id number] - Urine, Clean Catch-       - LORazepam (Ativan) 0.5 MG tablet; Take 1 tablet by mouth Every 8 (Eight) Hours As Needed for Anxiety.  Dispense: 30 tablet; Refill: 0    4. Encounter for long-term (current) use of medications  -     CBC & Differential  -     Comprehensive Metabolic Panel  -     TSH  -     Lipid Panel  -     Drug Profile [id number] - Urine, Clean Catch     I recommended taking vitamin C, 1000 mg, daily and zinc supplement if he is starting to have a cold.    5. Seminoma cancer    I recommend that the testosterone dosage be maintained and if blood pressure continues to be elevated that he contact Dr. Vyas to discuss the possibility of lowering the dosage.    6.  Long haul COVID-19 symptoms    I advised him that the perversion of smell usually lasts between 6 to 12 months.       Follow Up   Return in about 3 months (around 10/21/2022) for HTN.  Patient was given instructions and counseling regarding his condition or for health maintenance advice. Please see specific information pulled into the AVS if appropriate.     Transcribed from ambient dictation for Anna Ragland DO by Mimi  Antonio.  07/21/22   16:54 EDT    Patient verbalized consent to the visit recording.

## 2022-07-22 LAB
ALBUMIN SERPL-MCNC: 4.8 G/DL (ref 3.8–4.8)
ALBUMIN/GLOB SERPL: 2 {RATIO} (ref 1.2–2.2)
ALP SERPL-CCNC: 69 IU/L (ref 44–121)
ALT SERPL-CCNC: 21 IU/L (ref 0–44)
AMPHETAMINES UR QL SCN: NEGATIVE NG/ML
AST SERPL-CCNC: 23 IU/L (ref 0–40)
BARBITURATES UR QL: NEGATIVE NG/ML
BASOPHILS # BLD AUTO: 0.1 X10E3/UL (ref 0–0.2)
BASOPHILS NFR BLD AUTO: 1 %
BENZODIAZ UR QL SCN: NEGATIVE NG/ML
BILIRUB SERPL-MCNC: 0.7 MG/DL (ref 0–1.2)
BUN SERPL-MCNC: 17 MG/DL (ref 8–27)
BUN/CREAT SERPL: 16 (ref 10–24)
BZE UR QL: NEGATIVE NG/ML
CALCIUM SERPL-MCNC: 9.6 MG/DL (ref 8.6–10.2)
CANNABINOIDS UR QL SCN: NEGATIVE NG/ML
CHLORIDE SERPL-SCNC: 100 MMOL/L (ref 96–106)
CHOLEST SERPL-MCNC: 135 MG/DL (ref 100–199)
CO2 SERPL-SCNC: 22 MMOL/L (ref 20–29)
CREAT SERPL-MCNC: 1.06 MG/DL (ref 0.76–1.27)
CREAT UR-MCNC: 91.3 MG/DL (ref 20–300)
EGFRCR SERPLBLD CKD-EPI 2021: 78 ML/MIN/1.73
EOSINOPHIL # BLD AUTO: 0.1 X10E3/UL (ref 0–0.4)
EOSINOPHIL NFR BLD AUTO: 1 %
ERYTHROCYTE [DISTWIDTH] IN BLOOD BY AUTOMATED COUNT: 13.3 % (ref 11.6–15.4)
ETHANOL UR-MCNC: NEGATIVE %
GLOBULIN SER CALC-MCNC: 2.4 G/DL (ref 1.5–4.5)
GLUCOSE SERPL-MCNC: 87 MG/DL (ref 65–99)
HCT VFR BLD AUTO: 52.1 % (ref 37.5–51)
HDLC SERPL-MCNC: 53 MG/DL
HGB BLD-MCNC: 17.6 G/DL (ref 13–17.7)
IMM GRANULOCYTES # BLD AUTO: 0 X10E3/UL (ref 0–0.1)
IMM GRANULOCYTES NFR BLD AUTO: 0 %
LDLC SERPL CALC-MCNC: 68 MG/DL (ref 0–99)
LYMPHOCYTES # BLD AUTO: 1.2 X10E3/UL (ref 0.7–3.1)
LYMPHOCYTES NFR BLD AUTO: 15 %
MCH RBC QN AUTO: 29.6 PG (ref 26.6–33)
MCHC RBC AUTO-ENTMCNC: 33.8 G/DL (ref 31.5–35.7)
MCV RBC AUTO: 88 FL (ref 79–97)
MEPERIDINE UR QL: NEGATIVE NG/ML
METHADONE UR QL: NEGATIVE NG/ML
MONOCYTES # BLD AUTO: 0.6 X10E3/UL (ref 0.1–0.9)
MONOCYTES NFR BLD AUTO: 8 %
NEUTROPHILS # BLD AUTO: 6 X10E3/UL (ref 1.4–7)
NEUTROPHILS NFR BLD AUTO: 75 %
OPIATES UR QL SCN: NEGATIVE NG/ML
OXYCODONE+OXYMORPHONE UR QL SCN: NEGATIVE NG/ML
PCP UR QL: NEGATIVE NG/ML
PLATELET # BLD AUTO: 229 X10E3/UL (ref 150–450)
POTASSIUM SERPL-SCNC: 4.7 MMOL/L (ref 3.5–5.2)
PROPOXYPH UR QL: NEGATIVE NG/ML
PROT SERPL-MCNC: 7.2 G/DL (ref 6–8.5)
RBC # BLD AUTO: 5.95 X10E6/UL (ref 4.14–5.8)
SODIUM SERPL-SCNC: 140 MMOL/L (ref 134–144)
TRAMADOL UR QL SCN: NEGATIVE NG/ML
TRIGL SERPL-MCNC: 70 MG/DL (ref 0–149)
TSH SERPL DL<=0.005 MIU/L-ACNC: 1.1 UIU/ML (ref 0.45–4.5)
VLDLC SERPL CALC-MCNC: 14 MG/DL (ref 5–40)
WBC # BLD AUTO: 8 X10E3/UL (ref 3.4–10.8)

## 2022-07-27 ENCOUNTER — PATIENT ROUNDING (BHMG ONLY) (OUTPATIENT)
Dept: FAMILY MEDICINE CLINIC | Facility: CLINIC | Age: 67
End: 2022-07-27

## 2022-07-27 NOTE — PROGRESS NOTES
Sent Patient following in [employer] Message:  My name is Gerardo Oneill      I am the Practice Manager with   [facility] [address]  [facility] [id number] [phone].      I am messaging to officially welcome you to our practice and ask about your recent visit.     Tell me about your visit with us. What things went well?         We're always looking for ways to make our patients' experiences even better. Do you have recommendations on ways we may improve?       Overall were you satisfied with your first visit to our practice?        Is there anything else I can do for you?       Thank you, and have a great day.

## 2022-10-06 ENCOUNTER — PATIENT ROUNDING (BHMG ONLY) (OUTPATIENT)
Dept: ORTHOPEDIC SURGERY | Facility: CLINIC | Age: 67
End: 2022-10-06

## 2022-10-06 ENCOUNTER — OFFICE VISIT (OUTPATIENT)
Dept: ORTHOPEDIC SURGERY | Facility: CLINIC | Age: 67
End: 2022-10-06

## 2022-10-06 VITALS — TEMPERATURE: 97.9 F | WEIGHT: 176 LBS | BODY MASS INDEX: 29.32 KG/M2 | HEIGHT: 65 IN

## 2022-10-06 DIAGNOSIS — M25.562 ACUTE PAIN OF LEFT KNEE: ICD-10-CM

## 2022-10-06 DIAGNOSIS — R52 PAIN: Primary | ICD-10-CM

## 2022-10-06 PROCEDURE — 73562 X-RAY EXAM OF KNEE 3: CPT | Performed by: ORTHOPAEDIC SURGERY

## 2022-10-06 PROCEDURE — 99214 OFFICE O/P EST MOD 30 MIN: CPT | Performed by: NURSE PRACTITIONER

## 2022-10-06 PROCEDURE — 20610 DRAIN/INJ JOINT/BURSA W/O US: CPT | Performed by: NURSE PRACTITIONER

## 2022-10-06 RX ORDER — DIPHENOXYLATE HYDROCHLORIDE AND ATROPINE SULFATE 2.5; .025 MG/1; MG/1
TABLET ORAL
COMMUNITY
Start: 2000-01-01

## 2022-10-06 RX ORDER — METHYLPREDNISOLONE ACETATE 80 MG/ML
80 INJECTION, SUSPENSION INTRA-ARTICULAR; INTRALESIONAL; INTRAMUSCULAR; SOFT TISSUE
Status: COMPLETED | OUTPATIENT
Start: 2022-10-06 | End: 2022-10-06

## 2022-10-06 RX ORDER — TADALAFIL 5 MG/1
TABLET ORAL
COMMUNITY
Start: 2022-07-27

## 2022-10-06 RX ADMIN — METHYLPREDNISOLONE ACETATE 80 MG: 80 INJECTION, SUSPENSION INTRA-ARTICULAR; INTRALESIONAL; INTRAMUSCULAR; SOFT TISSUE at 10:09

## 2022-10-06 NOTE — PROGRESS NOTES
A Jipio Message has been sent to the patient for PATIENT ROUNDING with [facility]

## 2022-10-06 NOTE — PROGRESS NOTES
"Patient: Roney Trinidad  YOB: 1955 67 y.o. male  Medical Record Number: [id number]    Chief Complaints:   Chief Complaint   Patient presents with   • Left Knee - Initial Evaluation, Pain       History of Present Illness:Roney Trinidad is a 67 y.o. male who presents as a new patient both myself as well as to the practice with complaints of left knee pain.  The patient reports at age 16 at a baseball injury to his left knee had pain following that injury for quite some time.  In 2014 he had increased pain in that left knee and had knee arthroscopy by Dr. Vyas for\" a cleanout\" his symptoms did improve for a few years but starting in 2020 he has noticed some increased left knee pain.  He has never had an injection or physical therapy.  Patient reports the pain has been worse since last week, he apparently was at [employer] during her can try to get some exercise so he was running up some stairs as exercise and started with increased pain after that point.    Allergies:   Allergies   Allergen Reactions   • Sulfamethoxazole-Trimethoprim Nausea Only       Medications:   Current Outpatient Medications   Medication Sig Dispense Refill   • amLODIPine (NORVASC) 10 MG tablet Take 10 mg by mouth Daily.     • aspirin 81 MG EC tablet Take 81 mg by mouth Daily.     • B-D 3CC LUER-PRABHAKAR SYR 22GX1\" 22G X 1\" 3 ML misc INJECT TESTOSTERONE AS DIRECTED     • Cholecalciferol 250 MCG (57397 UT) capsule Take 10,000 Units by mouth Daily.     • fexofenadine-pseudoephedrine (ALLEGRA-D 24) 180-240 MG per 24 hr tablet Take 1 tablet by mouth Daily.     • fluticasone (VERAMYST) 27.5 MCG/SPRAY nasal spray 2 sprays into the nostril(s) as directed by provider Daily. 26.4 mL 4   • ibuprofen (ADVIL,MOTRIN) 800 MG tablet Take 800 mg by mouth.     • LORazepam (Ativan) 0.5 MG tablet Take 1 tablet by mouth Every 8 (Eight) Hours As Needed for Anxiety. 30 tablet 0   • meloxicam (MOBIC) 15 MG tablet Take 15 mg by mouth Daily.     • " "multivitamin (THERAGRAN) tablet tablet      • Omega-3 Fatty Acids (fish oil) 1000 MG capsule capsule Take  by mouth.     • rosuvastatin (CRESTOR) 10 MG tablet TAKE 1 TABLET BY MOUTH EVERY DAY 90 tablet 4   • Scopolamine (Transderm-Scop, 1.5 MG,) 1 MG/3DAYS patch Place 1 patch on the skin as directed by provider Every 72 (Seventy-Two) Hours. 4 patch 2   • sildenafil (REVATIO) 20 MG tablet Take 1 tablet by mouth Daily As Needed (ed). 90 tablet 4   • tadalafil (CIALIS) 5 MG tablet TAKE 1 - 4 tablets BY MOUTH 1 hour prior to sexual activity     • Testosterone Cypionate (DEPOTESTOTERONE CYPIONATE) 200 MG/ML injection INJECT 0.5 INTRAMUSCULARLY EVERY WEEK       No current facility-administered medications for this visit.         The following portions of the patient's history were reviewed and updated as appropriate: allergies, current medications, past family history, past medical history, past social history, past surgical history and problem list.    Review of Systems:   A 14 point review of systems was performed. All systems negative except pertinent positives/negative listed in HPI above    Physical Exam:   Vitals:    10/06/22 0842   Temp: 97.9 °F (36.6 °C)   Weight: 79.8 kg (176 lb)   Height: 165.1 cm (65\")   PainSc:   7   PainLoc: Knee       General: A and O x 3, ASA, NAD    SCLERA:    Normal    Skin clear no unusual lesions noted  Left knee patient has trace amount of effusion noted with 116 degrees flexion neutral in extension with a positive Chuy negative Lockman calf soft and nontender       Radiology:  Xrays 3views (ap,lateral, sunrise) left knee were ordered and reviewed today secondary to pain and show moderate arthritic changes.  No compared to views available    Assessment/Plan: Osteoarthritis left knee with increasing pain    The patient I discussed options, Dr. Cross also saw the patient, we will proceed with left knee cortisone injection, physical therapy, Pennsaid gel twice daily, and I will see " him back in 3 months for follow-up    Large Joint Arthrocentesis: L knee  Date/Time: 10/6/2022 10:09 AM  Consent given by: patient  Site marked: site marked  Timeout: Immediately prior to procedure a time out was called to verify the correct patient, procedure, equipment, support staff and site/side marked as required   Supporting Documentation  Indications: pain and joint swelling   Procedure Details  Location: knee - L knee  Preparation: Patient was prepped and draped in the usual sterile fashion  Needle size: 22 G  Approach: anterolateral  Medications administered: 80 mg methylPREDNISolone acetate 80 MG/ML; 2 mL lidocaine (cardiac)  Patient tolerance: patient tolerated the procedure well with no immediate complications            Darya Li, APRN  10/6/2022

## 2022-10-21 ENCOUNTER — OFFICE VISIT (OUTPATIENT)
Dept: FAMILY MEDICINE CLINIC | Facility: CLINIC | Age: 67
End: 2022-10-21

## 2022-10-21 VITALS
HEIGHT: 65 IN | OXYGEN SATURATION: 99 % | BODY MASS INDEX: 29.16 KG/M2 | DIASTOLIC BLOOD PRESSURE: 76 MMHG | WEIGHT: 175 LBS | HEART RATE: 72 BPM | TEMPERATURE: 98.2 F | SYSTOLIC BLOOD PRESSURE: 116 MMHG

## 2022-10-21 DIAGNOSIS — Z79.899 ENCOUNTER FOR LONG-TERM (CURRENT) USE OF MEDICATIONS: ICD-10-CM

## 2022-10-21 DIAGNOSIS — M25.562 ACUTE PAIN OF LEFT KNEE: ICD-10-CM

## 2022-10-21 DIAGNOSIS — Z23 NEEDS FLU SHOT: ICD-10-CM

## 2022-10-21 DIAGNOSIS — I10 ESSENTIAL (PRIMARY) HYPERTENSION: Primary | ICD-10-CM

## 2022-10-21 DIAGNOSIS — E78.2 MIXED HYPERLIPIDEMIA: ICD-10-CM

## 2022-10-21 DIAGNOSIS — Z12.11 SCREENING FOR MALIGNANT NEOPLASM OF COLON: ICD-10-CM

## 2022-10-21 PROCEDURE — 90662 IIV NO PRSV INCREASED AG IM: CPT | Performed by: FAMILY MEDICINE

## 2022-10-21 PROCEDURE — 99214 OFFICE O/P EST MOD 30 MIN: CPT | Performed by: FAMILY MEDICINE

## 2022-10-21 PROCEDURE — G0008 ADMIN INFLUENZA VIRUS VAC: HCPCS | Performed by: FAMILY MEDICINE

## 2022-10-21 RX ORDER — MELOXICAM 15 MG/1
15 TABLET ORAL DAILY
Qty: 90 TABLET | Refills: 0 | Status: SHIPPED | OUTPATIENT
Start: 2022-10-21

## 2022-10-21 RX ORDER — ROSUVASTATIN CALCIUM 10 MG/1
10 TABLET, COATED ORAL DAILY
Qty: 90 TABLET | Refills: 4 | Status: SHIPPED | OUTPATIENT
Start: 2022-10-21

## 2022-10-21 NOTE — PROGRESS NOTES
"Answers for HPI/ROS submitted by the patient on 10/19/2022  What is the primary reason for your visit?: Other  Please describe your symptoms.: Follow-up from previous visit  Have you had these symptoms before?: No  How long have you been having these symptoms?: 1-4 days  Please list any medications you are currently taking for this condition.: N/A  Please describe any probable cause for these symptoms. : N/A    Chief Complaint  Hypertension    Subjective        Roney Trinidad presents to [facility]  History of Present Illness  Patient is here today to follow-up on his chronic health issues:    Hypertension  He notes that he has had borderline hypertension for several years, however he was diagnosed with hypertension in early 2022. He reports that in the last 3 months his blood pressure has elevated to 170 over 90 mm/Hg. Dr. Hurd, his cardiologist, prescribed amlodipine, he is currently taking 10 mg daily. He notes that his wife monitors his blood pressure at home and his readings have been good lately.     Hyperlipidemia  He reports having borderline hyperlipidemia for several years, however he was diagnosed with hyperlipidemia in 2022.  He is currently taking rosuvastatin 10 mg daily.  Cholesterol was at goal 7/2022      Objective   Vital Signs:  /76   Pulse 72   Temp 98.2 °F (36.8 °C)   Ht 165.1 cm (65\")   Wt 79.4 kg (175 lb)   SpO2 99%   BMI 29.12 kg/m²   Estimated body mass index is 29.12 kg/m² as calculated from the following:    Height as of this encounter: 165.1 cm (65\").    Weight as of this encounter: 79.4 kg (175 lb).          Physical Exam  Vitals and nursing note reviewed.   Constitutional:       Appearance: Normal appearance. He is well-developed.   HENT:      Head: Normocephalic and atraumatic.      Right Ear: External ear normal.      Left Ear: External ear normal.      Nose: Nose normal.   Eyes:      General: No scleral icterus.     Conjunctiva/sclera: " Conjunctivae normal.   Cardiovascular:      Rate and Rhythm: Normal rate and regular rhythm.      Heart sounds: Normal heart sounds.   Pulmonary:      Effort: Pulmonary effort is normal.      Breath sounds: Normal breath sounds.   Musculoskeletal:      Cervical back: Normal range of motion and neck supple.   Lymphadenopathy:      Cervical: No cervical adenopathy.   Skin:     General: Skin is warm and dry.      Findings: No rash.   Neurological:      Mental Status: He is alert and oriented to person, place, and time.   Psychiatric:         Mood and Affect: Mood normal.         Behavior: Behavior normal.         Thought Content: Thought content normal.         Judgment: Judgment normal.        Result Review :  The following data was reviewed by: Anna Ragland DO on 10/21/2022:  Common labs    Common Labs 7/21/22 7/21/22 7/21/22    1358 1358 1358   Glucose  87    BUN  17    Creatinine  1.06    Sodium  140    Potassium  4.7    Chloride  100    Calcium  9.6    Total Protein  7.2    Albumin  4.8    Total Bilirubin  0.7    Alkaline Phosphatase  69    AST (SGOT)  23    ALT (SGPT)  21    WBC 8.0     Hemoglobin 17.6     Hematocrit 52.1 (A)     Platelets 229     Total Cholesterol   135   Triglycerides   70   HDL Cholesterol   53   LDL Cholesterol    68   (A) Abnormal value            TSH    TSH 7/21/22   TSH 1.100                     Assessment and Plan   Diagnoses and all orders for this visit:    1. Essential (primary) hypertension (Primary)  -     Comprehensive Metabolic Panel    2. Mixed hyperlipidemia  -     rosuvastatin (CRESTOR) 10 MG tablet; Take 1 tablet by mouth Daily.  Dispense: 90 tablet; Refill: 4    3. Screening for malignant neoplasm of colon  -     Cologuard - Stool, Per Rectum; Future    4. Encounter for long-term (current) use of medications  -     Comprehensive Metabolic Panel    5. Acute pain of left knee  -     meloxicam (MOBIC) 15 MG tablet; Take 1 tablet by mouth Daily.  Dispense: 90 tablet; Refill:  0    6. Needs flu shot  -     Fluzone High-Dose 65+yrs ([id number])    Patient is here today to follow-up on chronic stable hypertension and hyperlipidemia. Surveillance labs were obtained today and any medication changes will be made based on lab results and will be called to the patient later this week.         Follow Up   Return in about 6 months (around 4/19/2023) for Medicare Wellness, HTN.  Patient was given instructions and counseling regarding his condition or for health maintenance advice. Please see specific information pulled into the AVS if appropriate.

## 2022-10-22 LAB
ALBUMIN SERPL-MCNC: 4.7 G/DL (ref 3.5–5.2)
ALBUMIN/GLOB SERPL: 2.4 G/DL
ALP SERPL-CCNC: 77 U/L (ref 39–117)
ALT SERPL-CCNC: 22 U/L (ref 1–41)
AST SERPL-CCNC: 23 U/L (ref 1–40)
BILIRUB SERPL-MCNC: 0.7 MG/DL (ref 0–1.2)
BUN SERPL-MCNC: 16 MG/DL (ref 8–23)
BUN/CREAT SERPL: 13.4 (ref 7–25)
CALCIUM SERPL-MCNC: 9.8 MG/DL (ref 8.6–10.5)
CHLORIDE SERPL-SCNC: 102 MMOL/L (ref 98–107)
CO2 SERPL-SCNC: 29.2 MMOL/L (ref 22–29)
CREAT SERPL-MCNC: 1.19 MG/DL (ref 0.76–1.27)
EGFRCR SERPLBLD CKD-EPI 2021: 67.8 ML/MIN/1.73
GLOBULIN SER CALC-MCNC: 2 GM/DL
GLUCOSE SERPL-MCNC: 100 MG/DL (ref 65–99)
POTASSIUM SERPL-SCNC: 4.6 MMOL/L (ref 3.5–5.2)
PROT SERPL-MCNC: 6.7 G/DL (ref 6–8.5)
SODIUM SERPL-SCNC: 141 MMOL/L (ref 136–145)

## 2023-01-10 ENCOUNTER — CLINICAL SUPPORT (OUTPATIENT)
Dept: ORTHOPEDIC SURGERY | Facility: CLINIC | Age: 68
End: 2023-01-10
Payer: MEDICARE

## 2023-01-10 VITALS — BODY MASS INDEX: 29.16 KG/M2 | TEMPERATURE: 98.6 F | HEIGHT: 65 IN | WEIGHT: 175 LBS

## 2023-01-10 DIAGNOSIS — M17.12 PRIMARY OSTEOARTHRITIS OF LEFT KNEE: ICD-10-CM

## 2023-01-10 DIAGNOSIS — M25.562 ACUTE PAIN OF LEFT KNEE: Primary | ICD-10-CM

## 2023-01-10 PROCEDURE — 20610 DRAIN/INJ JOINT/BURSA W/O US: CPT | Performed by: NURSE PRACTITIONER

## 2023-01-10 RX ORDER — LIDOCAINE HYDROCHLORIDE 10 MG/ML
2 INJECTION, SOLUTION EPIDURAL; INFILTRATION; INTRACAUDAL; PERINEURAL
Status: COMPLETED | OUTPATIENT
Start: 2023-01-10 | End: 2023-01-10

## 2023-01-10 RX ORDER — METHYLPREDNISOLONE ACETATE 80 MG/ML
80 INJECTION, SUSPENSION INTRA-ARTICULAR; INTRALESIONAL; INTRAMUSCULAR; SOFT TISSUE
Status: COMPLETED | OUTPATIENT
Start: 2023-01-10 | End: 2023-01-10

## 2023-01-10 RX ADMIN — METHYLPREDNISOLONE ACETATE 80 MG: 80 INJECTION, SUSPENSION INTRA-ARTICULAR; INTRALESIONAL; INTRAMUSCULAR; SOFT TISSUE at 11:30

## 2023-01-10 RX ADMIN — LIDOCAINE HYDROCHLORIDE 2 ML: 10 INJECTION, SOLUTION EPIDURAL; INFILTRATION; INTRACAUDAL; PERINEURAL at 11:30

## 2023-01-10 NOTE — PROGRESS NOTES
1/10/2023    Roney Trinidad is here today for worsening knee pain. Pt has undergone injection of the knee in the past with good resolution of symptoms. Pt is requesting a repeat injection.     KNEE Injection Procedure Note:    Large Joint Arthrocentesis: L knee  Date/Time: 1/10/2023 11:30 AM  Consent given by: patient  Site marked: site marked  Timeout: Immediately prior to procedure a time out was called to verify the correct patient, procedure, equipment, support staff and site/side marked as required   Supporting Documentation  Indications: pain   Procedure Details  Location: knee - L knee  Preparation: Patient was prepped and draped in the usual sterile fashion  Needle gauge: 21.  Approach: anterolateral  Medications administered: 80 mg methylPREDNISolone acetate 80 MG/ML; 2 mL lidocaine PF 1% 1 %  Patient tolerance: patient tolerated the procedure well with no immediate complications          At the conclusion of the injection I discussed the importance of continued quad strengthening exercises on a daily basis. I will see the patient back if the symptoms should fail to improve or worsen.    Darya Li, APRN

## 2023-03-24 DIAGNOSIS — F41.0 PANIC ATTACKS: ICD-10-CM

## 2023-03-24 RX ORDER — LORAZEPAM 0.5 MG/1
0.5 TABLET ORAL EVERY 8 HOURS PRN
Qty: 30 TABLET | Refills: 0 | Status: SHIPPED | OUTPATIENT
Start: 2023-03-24

## 2023-04-13 DIAGNOSIS — M17.12 PRIMARY OSTEOARTHRITIS OF LEFT KNEE: Primary | ICD-10-CM

## 2023-04-14 ENCOUNTER — TELEPHONE (OUTPATIENT)
Dept: ORTHOPEDIC SURGERY | Facility: CLINIC | Age: 68
End: 2023-04-14

## 2023-04-14 NOTE — TELEPHONE ENCOUNTER
Caller: Roney Trinidad    Relationship to patient: Self    Best call back number:     Chief complaint: RIGHT KNEE    Type of visit: CORTISONE INJECTION    Requested date: THE WEEK OF 4/24/23    If rescheduling, when is the original appointment: 4/20/23    Additional notes: THE PATIENT WOULD ALSO LIKE TO KNOW IF HIS INSURANCE HAS APPROVED GEL INJECTIONS

## 2023-04-17 NOTE — TELEPHONE ENCOUNTER
PT IS CALLED 04/14 TO RESCHEDULE HIS 04/20 INJ APPT WITH [facility] AND HE RECEIVED NO ANSWER FROM THE OFFICE YET. PT FOLLOWING UP AGAIN NEEDING TO RESCHEDULE APPT FOR THE WEEK OF 04/24 FROM 10 AM -2 PM. PLEASE CALL PATIENT BACK [phone]. ( PT WANTS THE GEL INJECTION)  UNABLE TO WARM TRANSFER.

## 2023-04-27 ENCOUNTER — CLINICAL SUPPORT (OUTPATIENT)
Dept: ORTHOPEDIC SURGERY | Facility: CLINIC | Age: 68
End: 2023-04-27
Payer: MEDICARE

## 2023-04-27 VITALS — TEMPERATURE: 97.1 F | WEIGHT: 170 LBS | BODY MASS INDEX: 28.32 KG/M2 | HEIGHT: 65 IN

## 2023-04-27 DIAGNOSIS — M17.12 PRIMARY OSTEOARTHRITIS OF LEFT KNEE: ICD-10-CM

## 2023-04-27 RX ORDER — LIDOCAINE HYDROCHLORIDE 10 MG/ML
2 INJECTION, SOLUTION EPIDURAL; INFILTRATION; INTRACAUDAL; PERINEURAL
Status: COMPLETED | OUTPATIENT
Start: 2023-04-27 | End: 2023-04-27

## 2023-04-27 RX ADMIN — LIDOCAINE HYDROCHLORIDE 2 ML: 10 INJECTION, SOLUTION EPIDURAL; INFILTRATION; INTRACAUDAL; PERINEURAL at 16:03

## 2023-04-27 NOTE — PROGRESS NOTES
4/27/2023    Roney Trinidad is here today for worsening knee pain. Pt has undergone injection of the knee in the past with good resolution of symptoms. Pt is requesting a repeat injection.     KNEE Injection Procedure Note:    Large Joint Arthrocentesis: L knee  Date/Time: 4/27/2023 4:03 PM  Consent given by: patient  Site marked: site marked  Timeout: Immediately prior to procedure a time out was called to verify the correct patient, procedure, equipment, support staff and site/side marked as required   Supporting Documentation  Indications: pain and joint swelling   Procedure Details  Location: knee - L knee  Preparation: Patient was prepped and draped in the usual sterile fashion  Needle size: 22 G  Approach: anterolateral  Medications administered: 2 mL lidocaine (cardiac); 60 mg Sodium Hyaluronate 60 MG/3ML  Patient tolerance: patient tolerated the procedure well with no immediate complications      3 views of left knee were ordered and reviewed today secondary to pain and show significant osteoarthritis.  Compared to views are unchanged    At the conclusion of the injection I discussed the importance of continued quad strengthening exercises on a daily basis. I will see the patient back if the symptoms should fail to improve or worsen.    Darya Li, APRN  4/27/2023

## 2023-04-27 NOTE — PROGRESS NOTES
4/27/2023    Roney Trinidad is here today for worsening knee pain. Pt has undergone injection of the knee in the past with good resolution of symptoms. Pt is requesting a repeat injection.     KNEE Injection Procedure Note:    Large Joint Arthrocentesis: L knee  Date/Time: 4/27/2023 4:03 PM  Consent given by: patient  Site marked: site marked  Timeout: Immediately prior to procedure a time out was called to verify the correct patient, procedure, equipment, support staff and site/side marked as required   Supporting Documentation  Indications: pain   Procedure Details  Location: knee - L knee  Preparation: Patient was prepped and draped in the usual sterile fashion  Needle gauge: 21.  Approach: anterolateral  Medications administered: 60 mg Sodium Hyaluronate 60 MG/3ML; 2 mL lidocaine PF 1% 1 %  Patient tolerance: patient tolerated the procedure well with no immediate complications          At the conclusion of the injection I discussed the importance of continued quad strengthening exercises on a daily basis. I will see the patient back if the symptoms should fail to improve or worsen.

## 2023-06-12 DIAGNOSIS — M25.562 ACUTE PAIN OF LEFT KNEE: ICD-10-CM

## 2023-06-12 RX ORDER — MELOXICAM 15 MG/1
15 TABLET ORAL DAILY
Qty: 90 TABLET | Refills: 0 | Status: SHIPPED | OUTPATIENT
Start: 2023-06-12

## 2023-08-28 ENCOUNTER — TELEPHONE (OUTPATIENT)
Dept: FAMILY MEDICINE CLINIC | Facility: CLINIC | Age: 68
End: 2023-08-28

## 2023-08-28 ENCOUNTER — OFFICE VISIT (OUTPATIENT)
Dept: FAMILY MEDICINE CLINIC | Facility: CLINIC | Age: 68
End: 2023-08-28
Payer: MEDICARE

## 2023-08-28 VITALS
BODY MASS INDEX: 28.49 KG/M2 | HEART RATE: 83 BPM | OXYGEN SATURATION: 98 % | HEIGHT: 65 IN | WEIGHT: 171 LBS | SYSTOLIC BLOOD PRESSURE: 123 MMHG | DIASTOLIC BLOOD PRESSURE: 74 MMHG

## 2023-08-28 DIAGNOSIS — Z00.00 INITIAL MEDICARE ANNUAL WELLNESS VISIT: Primary | ICD-10-CM

## 2023-08-28 DIAGNOSIS — F41.0 PANIC ATTACKS: ICD-10-CM

## 2023-08-28 PROCEDURE — 1159F MED LIST DOCD IN RCRD: CPT | Performed by: FAMILY MEDICINE

## 2023-08-28 PROCEDURE — 1170F FXNL STATUS ASSESSED: CPT | Performed by: FAMILY MEDICINE

## 2023-08-28 PROCEDURE — 3078F DIAST BP <80 MM HG: CPT | Performed by: FAMILY MEDICINE

## 2023-08-28 PROCEDURE — 3074F SYST BP LT 130 MM HG: CPT | Performed by: FAMILY MEDICINE

## 2023-08-28 PROCEDURE — 1160F RVW MEDS BY RX/DR IN RCRD: CPT | Performed by: FAMILY MEDICINE

## 2023-08-28 PROCEDURE — G0402 INITIAL PREVENTIVE EXAM: HCPCS | Performed by: FAMILY MEDICINE

## 2023-08-28 RX ORDER — LORAZEPAM 0.5 MG/1
0.5 TABLET ORAL EVERY 8 HOURS PRN
Qty: 30 TABLET | Refills: 0 | Status: SHIPPED | OUTPATIENT
Start: 2023-08-28

## 2023-08-28 NOTE — TELEPHONE ENCOUNTER
Caller: Roney Trinidad    Relationship: Self    Best call back number: [phone]     What is the best time to reach you: ANY TIME    Who are you requesting to speak with (clinical staff, provider,  specific staff member): CLINICAL STAFF    What was the call regarding: PATIENT WANTS TO KNOW IF HE NEEDS TO FAST FOR HIS APPOINTMENT TODAY. HE HAD LABS DONE ON 6/26/23 SO HE WAS NOT SURE IF HE WILL BE GETTING MORE DONE TODAY OR NOT    PLEASE ADVISE    Is it okay if the provider responds through [employer]: NO, PHONE CALL

## 2023-08-28 NOTE — PROGRESS NOTES
"The ABCs of the Annual Wellness Visit  Subsequent Medicare Wellness Visit    Subjective    Roney Trinidad is a 68 y.o. male who presents for a Subsequent Medicare Wellness Visit.    The following portions of the patient's history were reviewed and   updated as appropriate: allergies, current medications, past family history, past medical history, past social history, past surgical history, and problem list.    Compared to one year ago, the patient feels his physical   health is the same.    Compared to one year ago, the patient feels his mental   health is the same.    Recent Hospitalizations:  He was not admitted to the hospital during the last year.       Current Medical Providers:  Patient Care Team:  Anna Ragland,  as PCP - General (Family Medicine)    Outpatient Medications Prior to Visit   Medication Sig Dispense Refill    amLODIPine (NORVASC) 10 MG tablet Take 1 tablet by mouth Daily.      aspirin 81 MG EC tablet Take 1 tablet by mouth Daily.      B-D 3CC LUER-PRABHAKAR SYR 22GX1\" 22G X 1\" 3 ML misc INJECT TESTOSTERONE AS DIRECTED      Cholecalciferol 250 MCG (17421 UT) capsule Take 10,000 Units by mouth Daily.      fexofenadine-pseudoephedrine (ALLEGRA-D 24) 180-240 MG per 24 hr tablet Take 1 tablet by mouth Daily.      fluticasone (VERAMYST) 27.5 MCG/SPRAY nasal spray 2 sprays into the nostril(s) as directed by provider Daily. 26.4 mL 4    meloxicam (MOBIC) 15 MG tablet Take 1 tablet by mouth Daily. 90 tablet 0    multivitamin (THERAGRAN) tablet tablet       rosuvastatin (CRESTOR) 10 MG tablet Take 1 tablet by mouth Daily. 90 tablet 4    Scopolamine (Transderm-Scop, 1.5 MG,) 1 MG/3DAYS patch Place 1 patch on the skin as directed by provider Every 72 (Seventy-Two) Hours. 4 patch 2    sildenafil (REVATIO) 20 MG tablet Take 1 tablet by mouth Daily As Needed (ed). 90 tablet 4    tadalafil (CIALIS) 5 MG tablet TAKE 1 - 4 tablets BY MOUTH 1 hour prior to sexual activity      Testosterone Cypionate " "(DEPOTESTOTERONE CYPIONATE) 200 MG/ML injection INJECT 0.5 INTRAMUSCULARLY EVERY WEEK      thiamine (VITAMIN B1) 100 MG tablet Take 1 tablet by mouth Daily.      VITAMIN D-VITAMIN K PO Take  by mouth.      zinc sulfate (ZINCATE) 220 (50 Zn) MG capsule Take 1 capsule by mouth Daily.      LORazepam (ATIVAN) 0.5 MG tablet Take 1 tablet by mouth Every 8 (Eight) Hours As Needed for Anxiety. 30 tablet 0    multivitamin (THERAGRAN) tablet tablet Take  by mouth. (Patient not taking: Reported on 8/28/2023)       No facility-administered medications prior to visit.       No opioid medication identified on active medication list. I have reviewed chart for other potential  high risk medication/s and harmful drug interactions in the elderly.        Aspirin is on active medication list. Aspirin use is indicated based on review of current medical condition/s. Pros and cons of this therapy have been discussed today. Benefits of this medication outweigh potential harm.  Patient has been encouraged to continue taking this medication.  .  Per Cardiology      Patient Active Problem List   Diagnosis    Primary osteoarthritis of right hip    Status post left hip replacement    Essential (primary) hypertension    Mixed hyperlipidemia    Seminoma (HCC)    Clinical diagnosis of severe acute respiratory syndrome coronavirus 2 (SARS-CoV-2) disease    Herpes simplex infection of penis     Advance Care Planning   Advance Care Planning     Advance Directive is not on file.  ACP discussion was held with the patient during this visit. Patient does not have an advance directive, information provided.     Objective    Vitals:    08/28/23 1404   BP: 123/74   Pulse: 83   SpO2: 98%   Weight: 77.6 kg (171 lb)   Height: 165.1 cm (65\")     Estimated body mass index is 28.46 kg/mý as calculated from the following:    Height as of this encounter: 165.1 cm (65\").    Weight as of this encounter: 77.6 kg (171 lb).    BMI is >= 25 and <30. (Overweight) The " following options were offered after discussion;: exercise counseling/recommendations and nutrition counseling/recommendations      Does the patient have evidence of cognitive impairment? No    Lab Results   Component Value Date    CHLPL 126 2023    TRIG 63 2023    HDL 58 2023    LDL 55 2023    VLDL 13 2023        HEALTH RISK ASSESSMENT    Smoking Status:  Social History     Tobacco Use   Smoking Status Never   Smokeless Tobacco Never   Tobacco Comments    none     Alcohol Consumption:  Social History     Substance and Sexual Activity   Alcohol Use Yes    Alcohol/week: 3.0 standard drinks    Types: 1 Cans of beer, 1 Shots of liquor, 1 Drinks containing 0.5 oz of alcohol per week    Comment: Do not drink every week     Fall Risk Screen:    SARITA Fall Risk Assessment was completed, and patient is at LOW risk for falls.Assessment completed on:2023    Depression Screenin/28/2023     2:02 PM   PHQ-2/PHQ-9 Depression Screening   Little Interest or Pleasure in Doing Things 0-->not at all   Feeling Down, Depressed or Hopeless 0-->not at all   PHQ-9: Brief Depression Severity Measure Score 0       Health Habits and Functional and Cognitive Screenin/28/2023     2:01 PM   Functional & Cognitive Status   Do you have difficulty preparing food and eating? No   Do you have difficulty bathing yourself, getting dressed or grooming yourself? No   Do you have difficulty using the toilet? No   Do you have difficulty moving around from place to place? No   Do you have trouble with steps or getting out of a bed or a chair? No   Current Diet Well Balanced Diet   Dental Exam Up to date   Eye Exam Up to date   Exercise (times per week) 4 times per week   Current Exercises Include Cardiovascular Workout;Weightlifting   Do you need help using the phone?  No   Are you deaf or do you have serious difficulty hearing?  No   Do you need help to go to places out of walking distance? No   Do you  "need help shopping? No   Do you need help preparing meals?  No   Do you need help with housework?  No   Do you need help with laundry? No   Do you need help taking your medications? No   Do you need help managing money? No   Do you ever drive or ride in a car without wearing a seat belt? No       Age-appropriate Screening Schedule:  Refer to the list below for future screening recommendations based on patient's age, sex and/or medical conditions. Orders for these recommended tests are listed in the plan section. The patient has been provided with a written plan.    Health Maintenance   Topic Date Due    ANNUAL WELLNESS VISIT  Never done    COVID-19 Vaccine (5 - Mixed Product series) 02/11/2022    Pneumococcal Vaccine 65+ (2 - PCV) 04/20/2022    INFLUENZA VACCINE  10/01/2023    LIPID PANEL  06/26/2024    COLOGUARD  10/27/2025    TDAP/TD VACCINES (3 - Td or Tdap) 07/09/2029    HEPATITIS C SCREENING  Completed    ZOSTER VACCINE  Completed                  CMS Preventative Services Quick Reference  Risk Factors Identified During Encounter  Immunizations Discussed/Encouraged: Influenza, Prevnar 20 (Pneumococcal 20-valent conjugate), Shingrix, and COVID19  Dental Screening Recommended  Vision Screening Recommended  seeing Dr Tavarez (OP) this week for eye exam.    The above risks/problems have been discussed with the patient.  Pertinent information has been shared with the patient in the After Visit Summary.  An After Visit Summary and PPPS were made available to the patient.    Follow Up:   Next Medicare Wellness visit to be scheduled in 1 year.              Objective   Vital Signs:  /74   Pulse 83   Ht 165.1 cm (65\")   Wt 77.6 kg (171 lb)   SpO2 98%   BMI 28.46 kg/mý     Physical Exam  Vitals and nursing note reviewed.   Constitutional:       Appearance: Normal appearance. He is well-developed.   HENT:      Head: Normocephalic and atraumatic.      Right Ear: External ear normal.      Left Ear: External ear " normal.      Nose: Nose normal.   Eyes:      General: No scleral icterus.     Conjunctiva/sclera: Conjunctivae normal.   Cardiovascular:      Rate and Rhythm: Normal rate and regular rhythm.      Heart sounds: Normal heart sounds.   Pulmonary:      Effort: Pulmonary effort is normal.      Breath sounds: Normal breath sounds.   Musculoskeletal:      Cervical back: Normal range of motion and neck supple.      Right lower leg: No edema.      Left lower leg: No edema.   Lymphadenopathy:      Cervical: No cervical adenopathy.   Skin:     General: Skin is warm and dry.      Findings: No rash.   Neurological:      Mental Status: He is alert and oriented to person, place, and time.   Psychiatric:         Mood and Affect: Mood normal.         Behavior: Behavior normal.         Thought Content: Thought content normal.         Judgment: Judgment normal.        The following data was reviewed by: Anna Ragland DO on 08/28/2023:  Common labs          10/21/2022    11:07 6/26/2023    10:22   Common Labs   Glucose 100  106    BUN 16  14    Creatinine 1.19  1.13    Sodium 141  141    Potassium 4.6  4.6    Chloride 102  102    Calcium 9.8  9.3    Total Protein 6.7  7.0    Albumin 4.70  4.9    Total Bilirubin 0.7  0.5    Alkaline Phosphatase 77  66    AST (SGOT) 23  28    ALT (SGPT) 22  34    WBC  6.7    Hemoglobin  16.0    Hematocrit  48.2    Platelets  221    Total Cholesterol  126    Triglycerides  63    HDL Cholesterol  58    LDL Cholesterol   55      TSH          6/26/2023    10:22   TSH   TSH 0.775                 Assessment and Plan   Diagnoses and all orders for this visit:    1. Initial Medicare annual wellness visit (Primary)    2. Panic attacks  -     LORazepam (ATIVAN) 0.5 MG tablet; Take 1 tablet by mouth Every 8 (Eight) Hours As Needed for Anxiety.  Dispense: 30 tablet; Refill: 0      RHM.  Colon cancer screening is up to date, PSA low by urologist.  Otherwise up to date           Follow Up   Return in about 6 months  (around 2/28/2024) for HTN, Lipids.  Patient was given instructions and counseling regarding his condition or for health maintenance advice. Please see specific information pulled into the AVS if appropriate.

## 2023-09-20 ENCOUNTER — TRANSCRIBE ORDERS (OUTPATIENT)
Dept: ADMINISTRATIVE | Facility: HOSPITAL | Age: 68
End: 2023-09-20
Payer: MEDICARE

## 2023-09-20 DIAGNOSIS — C62.90 MALIGNANT NEOPLASM OF TESTICLE, UNSPECIFIED LATERALITY, UNSPECIFIED WHETHER DESCENDED OR UNDESCENDED: Primary | ICD-10-CM

## 2023-09-26 ENCOUNTER — HOSPITAL ENCOUNTER (OUTPATIENT)
Dept: ULTRASOUND IMAGING | Facility: HOSPITAL | Age: 68
Discharge: HOME | End: 2023-09-26
Admitting: UROLOGY
Payer: MEDICARE

## 2023-09-26 DIAGNOSIS — C62.90 MALIGNANT NEOPLASM OF TESTICLE, UNSPECIFIED LATERALITY, UNSPECIFIED WHETHER DESCENDED OR UNDESCENDED: ICD-10-CM

## 2023-09-26 PROCEDURE — 76870 US EXAM SCROTUM: CPT

## 2023-10-06 ENCOUNTER — TELEMEDICINE (OUTPATIENT)
Dept: FAMILY MEDICINE CLINIC | Facility: CLINIC | Age: 68
End: 2023-10-06
Payer: MEDICARE

## 2023-10-06 DIAGNOSIS — R11.2 NAUSEA AND VOMITING, UNSPECIFIED VOMITING TYPE: Primary | ICD-10-CM

## 2023-10-06 PROCEDURE — 99213 OFFICE O/P EST LOW 20 MIN: CPT | Performed by: FAMILY MEDICINE

## 2023-10-06 PROCEDURE — 1160F RVW MEDS BY RX/DR IN RCRD: CPT | Performed by: FAMILY MEDICINE

## 2023-10-06 PROCEDURE — 1159F MED LIST DOCD IN RCRD: CPT | Performed by: FAMILY MEDICINE

## 2023-10-06 RX ORDER — ONDANSETRON 4 MG/1
4 TABLET, FILM COATED ORAL EVERY 8 HOURS PRN
Qty: 30 TABLET | Refills: 0 | Status: SHIPPED | OUTPATIENT
Start: 2023-10-06

## 2023-10-06 NOTE — PROGRESS NOTES
"Chief Complaint  nausea , Vomiting, and Abdominal Pain (In the center of abd )    Subjective         Roney Trinidad presents to [facility]  History of Present Illness  Patient requested a video visit to discuss some new symptoms he was having. He started 3 days ago with diarrhea X12 per day.  Also having some Nausea that started at the same time.  Yesterday he had 2 episodes of vomiting and this AM he had 2 episodes also.  Bile looking.  No blood or black stools.  No fevers.  +fatigue.  Patient is also having some abdominal cramps and bloating.  No recent antibiotic use and has never had C diff.  Patient took about 3 doses of Imodium and after that is when the vomiting occurred.  It has slowed down the diarrhea today.  States he is able to hydrate and has had normal urine output.  Objective   Vital Signs:   There were no vitals taken for this visit.    Estimated body mass index is 28.46 kg/m² as calculated from the following:    Height as of 8/28/23: 165.1 cm (65\").    Weight as of 8/28/23: 77.6 kg (171 lb).     Physical Exam   Constitutional: He appears well-developed and well-nourished.   HENT:   Head: Normocephalic and atraumatic.   Psychiatric: He has a normal mood and affect.   Result Review :                 Assessment and Plan    Diagnoses and all orders for this visit:    1. Nausea and vomiting, unspecified vomiting type (Primary)  -     ondansetron (Zofran) 4 MG tablet; Take 1 tablet by mouth Every 8 (Eight) Hours As Needed for Nausea or Vomiting.  Dispense: 30 tablet; Refill: 0    Patient requested a video visit to discuss new symptoms of nausea, diarrhea and vomiting.  No red flags by history.  Patient appears alert and well-hydrated.  I have advised he stop taking Imodium.  He should continue probiotics.  I will give him Zofran to help with nausea and vomiting.  He was advised to stop any dairy products until his symptoms resolved.  If he develops rectal bleeding, fever, or " abdominal pain he should seek more urgent medical care or follow-up.  I spent 20 minutes caring for Dom on this date of service. This time includes time spent by me in the following activities:performing a medically appropriate examination and/or evaluation , counseling and educating the patient/family/caregiver, ordering medications, tests, or procedures, and documenting information in the medical record  Follow Up   No follow-ups on file.  Patient was given instructions and counseling regarding his condition or for health maintenance advice. Please see specific information pulled into the AVS if appropriate.     Mode of Visit: Video  Location of patient: home  [place] [facility] [place] [facility]  You have chosen to receive care through a telehealth visit.  Does the patient consent to use a video/audio connection for your medical care today? Yes  The visit included audio and video interaction. No technical issues occurred during this visit.

## 2023-10-10 ENCOUNTER — TELEPHONE (OUTPATIENT)
Dept: FAMILY MEDICINE CLINIC | Facility: CLINIC | Age: 68
End: 2023-10-10

## 2023-10-10 NOTE — TELEPHONE ENCOUNTER
PATIENT STATES THAT AS OF YESTERDAY HE IS ABLE TO EAT AND HAS MADE IT THROUGH THE NIGHT WITHOUT VOMITING OR DIARRHEA

## 2023-11-27 ENCOUNTER — EXTERNAL PBMM DATA (OUTPATIENT)
Dept: PHARMACY | Facility: OTHER | Age: 68
End: 2023-11-27
Payer: MEDICARE

## 2023-12-12 DIAGNOSIS — E78.2 MIXED HYPERLIPIDEMIA: ICD-10-CM

## 2023-12-12 RX ORDER — ROSUVASTATIN CALCIUM 10 MG/1
10 TABLET, COATED ORAL DAILY
Qty: 90 TABLET | Refills: 4 | Status: SHIPPED | OUTPATIENT
Start: 2023-12-12

## 2024-01-08 ENCOUNTER — OFFICE VISIT (OUTPATIENT)
Dept: FAMILY MEDICINE CLINIC | Facility: CLINIC | Age: 69
End: 2024-01-08
Payer: MEDICARE

## 2024-01-08 VITALS
SYSTOLIC BLOOD PRESSURE: 119 MMHG | HEIGHT: 65 IN | WEIGHT: 169 LBS | OXYGEN SATURATION: 98 % | HEART RATE: 64 BPM | DIASTOLIC BLOOD PRESSURE: 67 MMHG | BODY MASS INDEX: 28.16 KG/M2

## 2024-01-08 DIAGNOSIS — R93.89 ABNORMAL FINDING ON CT SCAN: Primary | ICD-10-CM

## 2024-01-08 RX ORDER — VITAMIN B COMPLEX
CAPSULE ORAL DAILY
COMMUNITY
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
## 2024-01-09 ENCOUNTER — TELEPHONE (OUTPATIENT)
Dept: FAMILY MEDICINE CLINIC | Facility: CLINIC | Age: 69
End: 2024-01-09
Payer: MEDICARE

## 2024-01-09 DIAGNOSIS — R93.89 ABNORMAL FINDING ON CT SCAN: ICD-10-CM

## 2024-01-09 DIAGNOSIS — M43.6 TORTICOLLIS, ACUTE: ICD-10-CM

## 2024-01-09 DIAGNOSIS — M89.9 LESION OF BONE OF CERVICAL SPINE: ICD-10-CM

## 2024-01-09 DIAGNOSIS — M54.50 ACUTE BILATERAL LOW BACK PAIN WITHOUT SCIATICA: Primary | ICD-10-CM

## 2024-01-09 NOTE — TELEPHONE ENCOUNTER
Please reassure the patient that the radiologist seemed to be very reassured after speaking with me about the findings but was insistent on a repeat CT in 6 to 12 weeks just to make sure.  He will not need contrast for this CT.  I agree and feel that it is necessary.  I have entered an order for the CT to be performed in early March.

## 2024-01-09 NOTE — TELEPHONE ENCOUNTER
SPOKE TO PT ABOUT SETTING UP A CT SCAN FOR ABOUT 12 WEEKS.  HE IS OK WITH THAT BUT IS DEE CONCERNED BECAUSE OF ALL THE CT'S HE HAS HAD IN THE PAST.  HE IS STATING THAT HE HAD A CT OF THE HEAD A COUPLE OF YEARS AGO AND IS GOING TO TR TO GET A COPY OF THE SCAN.  TO SEE IF THERE IS SOME SORT OF COMPARISON THAT CAN BE DONE.  INFORMED HIM I WASN'T SURE IF THAT COULD HAPPEN.      HE IS WANTING TO KNOW IF YOU CAN SEND IN A PT ORDER FOR HIS BACK TO [facility].  PLEASE ADVISE.

## 2024-01-18 DIAGNOSIS — M25.562 ACUTE PAIN OF LEFT KNEE: ICD-10-CM

## 2024-01-18 RX ORDER — MELOXICAM 15 MG/1
15 TABLET ORAL DAILY
Qty: 90 TABLET | Refills: 0 | Status: SHIPPED | OUTPATIENT
Start: 2024-01-18

## 2024-01-23 ENCOUNTER — TELEMEDICINE (OUTPATIENT)
Dept: FAMILY MEDICINE CLINIC | Facility: CLINIC | Age: 69
End: 2024-01-23
Payer: MEDICARE

## 2024-01-23 DIAGNOSIS — A60.00 GENITAL HERPES SIMPLEX, UNSPECIFIED SITE: ICD-10-CM

## 2024-01-23 DIAGNOSIS — T75.3XXA MOTION SICKNESS, INITIAL ENCOUNTER: ICD-10-CM

## 2024-01-23 DIAGNOSIS — Z91.038 ALLERGIC TO INSECTS: Primary | ICD-10-CM

## 2024-01-23 PROCEDURE — 99213 OFFICE O/P EST LOW 20 MIN: CPT | Performed by: FAMILY MEDICINE

## 2024-01-23 RX ORDER — EPINEPHRINE 0.3 MG/.3ML
0.3 INJECTION SUBCUTANEOUS ONCE
Qty: 1 EACH | Refills: 0 | Status: SHIPPED | OUTPATIENT
Start: 2024-01-23 | End: 2024-01-23

## 2024-01-23 RX ORDER — ACYCLOVIR 800 MG/1
800 TABLET ORAL 3 TIMES DAILY
Qty: 15 TABLET | Refills: 3 | Status: SHIPPED | OUTPATIENT
Start: 2024-01-23

## 2024-01-23 RX ORDER — SCOLOPAMINE TRANSDERMAL SYSTEM 1 MG/1
1 PATCH, EXTENDED RELEASE TRANSDERMAL
Qty: 3 PATCH | Refills: 2 | Status: SHIPPED | OUTPATIENT
Start: 2024-01-23

## 2024-01-23 NOTE — PROGRESS NOTES
"Chief Complaint  DISCUSS MEDICATION FOR CRUISE    Subjective           Roney Trinidad presents to [facility]  History of Present Illness  Patient requested a video visit to discuss some medications that he needs refills of.    Genital herpes.  The patient intermittently uses acyclovir 800 mg twice daily for about 3 days when he has a outbreak of genital herpes.  He does not have these outbreaks often.  He denies any side effects of medication and tolerates it well.    Allergy to insects.  The patient would like an EpiPen to take with him on a cruise that he will be leaving on very soon.  He has had reactions to insect bites in the past.    Motion sickness.  The patient utilizes scopolamine patches for seasickness.  Since he is going on a 5-day cruise soon he would like the patches refilled.  He denies any side effects.    Objective   Vital Signs:   There were no vitals taken for this visit.    Estimated body mass index is 28.12 kg/m² as calculated from the following:    Height as of 1/8/24: 165.1 cm (65\").    Weight as of 1/8/24: 76.7 kg (169 lb).     Physical Exam   Constitutional: He appears well-developed and well-nourished.   HENT:   Head: Normocephalic and atraumatic.   Pulmonary/Chest: Effort normal.   Neurological: He is alert.     Result Review :                   Assessment and Plan      Diagnoses and all orders for this visit:    1. Allergic to insects (Primary)  -     EPINEPHrine (EpiPen 2-Magdi) 0.3 MG/0.3ML solution auto-injector injection; Inject 0.3 mL under the skin into the appropriate area as directed 1 (One) Time for 1 dose.  Dispense: 1 each; Refill: 0    2. Genital herpes simplex, unspecified site  -     acyclovir (ZOVIRAX) 800 MG tablet; Take 1 tablet by mouth 3 (Three) Times a Day.  Dispense: 15 tablet; Refill: 3    3. Motion sickness, initial encounter  -     Scopolamine (Transderm-Scop, 1.5 MG,) 1 MG/3DAYS patch; Place 1 patch on the skin as directed by provider " Every 72 (Seventy-Two) Hours.  Dispense: 3 patch; Refill: 2    Patient requested a video visit today to discuss getting some refills on medications he has used in the past for various conditions as above.  Refills were provided.  I spent 20 minutes caring for Dom on this date of service. This time includes time spent by me in the following activities:performing a medically appropriate examination and/or evaluation , counseling and educating the patient/family/caregiver, ordering medications, tests, or procedures, and documenting information in the medical record  Follow Up     No follow-ups on file.  Patient was given instructions and counseling regarding his condition or for health maintenance advice. Please see specific information pulled into the AVS if appropriate.     Mode of Visit: Video  Location of patient: home  Location of provider: [facility] [place] [facility]  You have chosen to receive care through a telehealth visit.  The patient has signed the video visit consent form.  The visit included audio and video interaction. No technical issues occurred during this visit.

## 2024-01-26 ENCOUNTER — TELEPHONE (OUTPATIENT)
Dept: FAMILY MEDICINE CLINIC | Facility: CLINIC | Age: 69
End: 2024-01-26

## 2024-01-26 NOTE — TELEPHONE ENCOUNTER
PATIENT CALLED TO CANCEL THE ORDER OF CT SCAN OF CERVICAL SPINE. HE IS GOING TO SEE DR. COLE WITH [facility]. THEY ARE TRYING TO GET HIM IN NEXT WEEK.    CALL BACK NUMBER [phone]

## 2024-03-04 ENCOUNTER — PATIENT MESSAGE (OUTPATIENT)
Dept: FAMILY MEDICINE CLINIC | Facility: CLINIC | Age: 69
End: 2024-03-04
Payer: MEDICARE

## 2024-03-04 NOTE — TELEPHONE ENCOUNTER
From: Roney Trinidad  To: Anna Ragland  Sent: 3/4/2024 8:42 AM EST  Subject: Amelia on March 11th    Just wondering if we still need the appointment for 3/11/24 for blood work. Recently, Dr Swartz ordered blood test (copies in [facility] my chart) and I sent the results to Dr Ragland, she said they look okay except the kidney results. On the March 11th I have an annual checkup with Dr Vyas ([facility]) and he will order blood; tumor markers, kidney function, testosterone level. With that said do I need to come into the office on 3/11?  Thank you,  Roney

## 2024-04-09 ENCOUNTER — TELEPHONE (OUTPATIENT)
Dept: FAMILY MEDICINE CLINIC | Facility: CLINIC | Age: 69
End: 2024-04-09
Payer: MEDICARE

## 2024-04-09 NOTE — TELEPHONE ENCOUNTER
Spoke to pt regarding CT of neck.    test was suppose to be cancelled saw onco and they did not feel he needed the test

## 2024-04-12 DIAGNOSIS — F41.0 PANIC ATTACKS: ICD-10-CM

## 2024-04-12 RX ORDER — LORAZEPAM 0.5 MG/1
0.5 TABLET ORAL EVERY 8 HOURS PRN
Qty: 30 TABLET | Refills: 0 | Status: SHIPPED | OUTPATIENT
Start: 2024-04-12

## 2024-04-12 NOTE — TELEPHONE ENCOUNTER
Dr Ragland's patient    Rx Refill Note  Requested Prescriptions     Pending Prescriptions Disp Refills    LORazepam (ATIVAN) 0.5 MG tablet [Pharmacy Med Name: lorazepam 0.5 mg tablet] 30 tablet 0     Sig: Take 1 tablet by mouth Every 8 (Eight) Hours As Needed for Anxiety.      Last office visit with prescribing clinician: 1/8/2024   Last telemedicine visit with prescribing clinician: 1/23/2024   Next office visit with prescribing clinician: 4/24/2024   Please advise this refill

## 2024-05-28 ENCOUNTER — OFFICE VISIT (OUTPATIENT)
Dept: FAMILY MEDICINE CLINIC | Facility: CLINIC | Age: 69
End: 2024-05-28
Payer: MEDICARE

## 2024-05-28 VITALS
DIASTOLIC BLOOD PRESSURE: 72 MMHG | SYSTOLIC BLOOD PRESSURE: 119 MMHG | HEART RATE: 69 BPM | OXYGEN SATURATION: 98 % | BODY MASS INDEX: 28.16 KG/M2 | HEIGHT: 65 IN | WEIGHT: 169 LBS

## 2024-05-28 DIAGNOSIS — G47.33 OSA ON CPAP: ICD-10-CM

## 2024-05-28 DIAGNOSIS — M89.9 LESION OF BONE OF CERVICAL SPINE: ICD-10-CM

## 2024-05-28 DIAGNOSIS — R93.89 ABNORMAL FINDING ON CT SCAN: Primary | ICD-10-CM

## 2024-05-28 PROCEDURE — 3074F SYST BP LT 130 MM HG: CPT | Performed by: FAMILY MEDICINE

## 2024-05-28 PROCEDURE — 99213 OFFICE O/P EST LOW 20 MIN: CPT | Performed by: FAMILY MEDICINE

## 2024-05-28 PROCEDURE — 1160F RVW MEDS BY RX/DR IN RCRD: CPT | Performed by: FAMILY MEDICINE

## 2024-05-28 PROCEDURE — 1159F MED LIST DOCD IN RCRD: CPT | Performed by: FAMILY MEDICINE

## 2024-05-28 PROCEDURE — 3078F DIAST BP <80 MM HG: CPT | Performed by: FAMILY MEDICINE

## 2024-05-28 PROCEDURE — 1125F AMNT PAIN NOTED PAIN PRSNT: CPT | Performed by: FAMILY MEDICINE

## 2024-05-28 NOTE — PROGRESS NOTES
"Chief Complaint  REVIEW LABS FROM OTHER PROVIDERS    Subjective        Roney Trinidad presents to [facility]  History of Present Illness  Patient is here today to follow-up on an abnormal finding on the CT of his cervical spine a few months ago.  With his history of testicular cancer there was some concern that this could be metastasis to the bone in that area.  However, the patient was seen by oncology who reviewed the CT as well as all of his tumor markers and told him that he did not need further imaging regarding the sclerotic lesion in his cervical spine.  The oncologist, Dr.Khuda Byrne, stated he felt that the radiologist was over reading the imaging and he did not recommend further workup.  The patient will have his next appointment with oncology in January 2025.  He will continue to see him yearly for eternity.    SANDEI on CPAP.  The patient was recently seen by a sleep specialist and diagnosed with SANDIE.  A CPAP machine was provided and today will be his first night with it.       Objective   Vital Signs:  /72   Pulse 69   Ht 165.1 cm (65\")   Wt 76.7 kg (169 lb)   SpO2 98%   BMI 28.12 kg/m²   Estimated body mass index is 28.12 kg/m² as calculated from the following:    Height as of this encounter: 165.1 cm (65\").    Weight as of this encounter: 76.7 kg (169 lb).         Physical Exam  Vitals and nursing note reviewed.   Constitutional:       Appearance: He is well-developed.   HENT:      Head: Normocephalic and atraumatic.   Eyes:      Conjunctiva/sclera: Conjunctivae normal.   Cardiovascular:      Rate and Rhythm: Normal rate and regular rhythm.      Heart sounds: Normal heart sounds.   Pulmonary:      Effort: Pulmonary effort is normal.      Breath sounds: Normal breath sounds.   Abdominal:      General: Bowel sounds are normal.      Palpations: Abdomen is soft.   Musculoskeletal:         General: Normal range of motion.      Cervical back: Normal range of motion and " neck supple.   Skin:     General: Skin is warm and dry.      Capillary Refill: Capillary refill takes less than 2 seconds.      Findings: No rash.   Neurological:      Mental Status: He is alert and oriented to person, place, and time.   Psychiatric:         Mood and Affect: Mood normal.         Behavior: Behavior normal.         Thought Content: Thought content normal.         Judgment: Judgment normal.        Result Review :                     Assessment and Plan     Diagnoses and all orders for this visit:    1. Abnormal finding on CT scan (Primary)    2. Lesion of bone of cervical spine    3. SANDIE on CPAP    Records requested from oncology.  If the records to confirm the information that the patient provided today regarding the cervical sclerotic lesion on the spine then I will support the patient and his oncologist decision to not do further testing.    SANDIE on CPAP.  Continue her pulmonology         Follow Up     No follow-ups on file.  Patient was given instructions and counseling regarding his condition or for health maintenance advice. Please see specific information pulled into the AVS if appropriate.         Answers submitted by the patient for this visit:  Primary Reason for Visit (Submitted on 5/28/2024)  What is the primary reason for your visit?: Other  Other (Submitted on 5/28/2024)  Please describe your symptoms.: Review Lab results  Have you had these symptoms before?: No  How long have you been having these symptoms?: 1-4 days  Please list any medications you are currently taking for this condition.: List on file  Please describe any probable cause for these symptoms. : Follow up visit

## 2024-07-08 DIAGNOSIS — N52.9 ERECTILE DYSFUNCTION, UNSPECIFIED ERECTILE DYSFUNCTION TYPE: ICD-10-CM

## 2024-07-09 RX ORDER — SILDENAFIL CITRATE 20 MG/1
20 TABLET ORAL DAILY PRN
Qty: 90 TABLET | Refills: 4 | Status: SHIPPED | OUTPATIENT
Start: 2024-07-09

## 2024-08-06 ENCOUNTER — TELEPHONE (OUTPATIENT)
Dept: FAMILY MEDICINE CLINIC | Facility: CLINIC | Age: 69
End: 2024-08-06
Payer: MEDICARE

## 2024-08-06 NOTE — TELEPHONE ENCOUNTER
HUB TO RELAY  LEFT VOICEMAIL STATING PT NEEDED TO RESCHEDULE APPT ON 9/3/24 BECAUSE DR ORELLANA IS OUT OF OFFICE.

## 2024-08-24 DIAGNOSIS — M25.562 ACUTE PAIN OF LEFT KNEE: ICD-10-CM

## 2024-08-26 RX ORDER — MELOXICAM 15 MG/1
15 TABLET ORAL DAILY
Qty: 90 TABLET | Refills: 0 | Status: SHIPPED | OUTPATIENT
Start: 2024-08-26

## 2024-08-30 DIAGNOSIS — F41.0 PANIC ATTACKS: ICD-10-CM

## 2024-08-30 RX ORDER — LORAZEPAM 0.5 MG/1
0.5 TABLET ORAL EVERY 8 HOURS PRN
Qty: 30 TABLET | Refills: 0 | Status: SHIPPED | OUTPATIENT
Start: 2024-08-30

## 2024-08-30 NOTE — TELEPHONE ENCOUNTER
Rx Refill Note  Requested Prescriptions     Pending Prescriptions Disp Refills    LORazepam (ATIVAN) 0.5 MG tablet [Pharmacy Med Name: lorazepam 0.5 mg tablet] 30 tablet 0     Sig: Take 1 tablet by mouth Every 8 (Eight) Hours As Needed for Anxiety.      Last office visit with prescribing clinician: Visit date not found   Last telemedicine visit with prescribing clinician: Visit date not found   Next office visit with prescribing clinician: Visit date not found                         Would you like a call back once the refill request has been completed: [] Yes [] No    If the office needs to give you a call back, can they leave a voicemail: [] Yes [] No    Vanessa Handley MA  08/30/24, 10:07 EDT

## 2024-12-05 ENCOUNTER — OFFICE VISIT (OUTPATIENT)
Dept: FAMILY MEDICINE CLINIC | Facility: CLINIC | Age: 69
End: 2024-12-05
Payer: MEDICARE

## 2024-12-05 VITALS
WEIGHT: 171 LBS | HEIGHT: 65 IN | BODY MASS INDEX: 28.49 KG/M2 | DIASTOLIC BLOOD PRESSURE: 65 MMHG | HEART RATE: 77 BPM | SYSTOLIC BLOOD PRESSURE: 119 MMHG | OXYGEN SATURATION: 99 %

## 2024-12-05 DIAGNOSIS — E78.2 MIXED HYPERLIPIDEMIA: ICD-10-CM

## 2024-12-05 DIAGNOSIS — Z79.899 ENCOUNTER FOR LONG-TERM (CURRENT) USE OF MEDICATIONS: ICD-10-CM

## 2024-12-05 DIAGNOSIS — I10 PRIMARY HYPERTENSION: ICD-10-CM

## 2024-12-05 DIAGNOSIS — Z00.00 MEDICARE ANNUAL WELLNESS VISIT, SUBSEQUENT: Primary | ICD-10-CM

## 2024-12-05 DIAGNOSIS — F41.0 PANIC ATTACKS: ICD-10-CM

## 2024-12-05 DIAGNOSIS — T75.3XXA MOTION SICKNESS, INITIAL ENCOUNTER: ICD-10-CM

## 2024-12-05 PROCEDURE — 3074F SYST BP LT 130 MM HG: CPT | Performed by: FAMILY MEDICINE

## 2024-12-05 PROCEDURE — 1125F AMNT PAIN NOTED PAIN PRSNT: CPT | Performed by: FAMILY MEDICINE

## 2024-12-05 PROCEDURE — G0439 PPPS, SUBSEQ VISIT: HCPCS | Performed by: FAMILY MEDICINE

## 2024-12-05 PROCEDURE — 1160F RVW MEDS BY RX/DR IN RCRD: CPT | Performed by: FAMILY MEDICINE

## 2024-12-05 PROCEDURE — 1159F MED LIST DOCD IN RCRD: CPT | Performed by: FAMILY MEDICINE

## 2024-12-05 PROCEDURE — 99214 OFFICE O/P EST MOD 30 MIN: CPT | Performed by: FAMILY MEDICINE

## 2024-12-05 PROCEDURE — 3078F DIAST BP <80 MM HG: CPT | Performed by: FAMILY MEDICINE

## 2024-12-05 RX ORDER — SCOLOPAMINE TRANSDERMAL SYSTEM 1 MG/1
1 PATCH, EXTENDED RELEASE TRANSDERMAL
Qty: 4 PATCH | Refills: 2 | Status: SHIPPED | OUTPATIENT
Start: 2024-12-05

## 2024-12-05 NOTE — PROGRESS NOTES
Subjective   The ABCs of the Annual Wellness Visit  Medicare Wellness Visit      Roney Trinidad is a 69 y.o. patient who presents for a Medicare Wellness Visit.    The following portions of the patient's history were reviewed and   updated as appropriate: allergies, current medications, past family history, past medical history, past social history, past surgical history, and problem list.    Compared to one year ago, the patient's physical   health is the same.  Compared to one year ago, the patient's mental   health is the same.    Recent Hospitalizations:  He was not admitted to the hospital during the last year.     Current Medical Providers:  Patient Care Team:  Anna Ragland DO as PCP - General (Family Medicine)  Brooks Vyas MD as Consulting Physician (Urology)  Manuelito Swartz MD as Consulting Physician (Cardiology)    Outpatient Medications Prior to Visit   Medication Sig Dispense Refill    acyclovir (ZOVIRAX) 800 MG tablet Take 1 tablet by mouth 3 (Three) Times a Day. 15 tablet 3    amLODIPine (NORVASC) 10 MG tablet Take 1 tablet by mouth Daily.      aspirin 81 MG EC tablet Take 1 tablet by mouth Daily.      Cholecalciferol 250 MCG (73065 UT) capsule Take 10,000 Units by mouth Daily.      fexofenadine-pseudoephedrine (ALLEGRA-D 24) 180-240 MG per 24 hr tablet Take 1 tablet by mouth Daily.      fluticasone (VERAMYST) 27.5 MCG/SPRAY nasal spray 2 sprays into the nostril(s) as directed by provider Daily. 26.4 mL 4    LORazepam (ATIVAN) 0.5 MG tablet Take 1 tablet by mouth Every 8 (Eight) Hours As Needed for Anxiety. 30 tablet 0    meloxicam (MOBIC) 15 MG tablet Take 1 tablet by mouth Daily. 90 tablet 0    NON FORMULARY METHYALCARE      ondansetron (Zofran) 4 MG tablet Take 1 tablet by mouth Every 8 (Eight) Hours As Needed for Nausea or Vomiting. 30 tablet 0    rosuvastatin (CRESTOR) 10 MG tablet Take 1 tablet by mouth Daily. 90 tablet 4    sildenafil (REVATIO) 20 MG tablet Take 1 tablet  "by mouth Daily As Needed (ed). 90 tablet 4    tadalafil (CIALIS) 5 MG tablet TAKE 1 - 4 tablets BY MOUTH 1 hour prior to sexual activity      Testosterone Cypionate (DEPOTESTOTERONE CYPIONATE) 200 MG/ML injection INJECT 0.5 INTRAMUSCULARLY EVERY WEEK      VITAMIN D-VITAMIN K PO Take  by mouth.      zinc sulfate (ZINCATE) 220 (50 Zn) MG capsule Take 1 capsule by mouth Daily.      Scopolamine (Transderm-Scop, 1.5 MG,) 1 MG/3DAYS patch Place 1 patch on the skin as directed by provider Every 72 (Seventy-Two) Hours. 3 patch 2    B Complex Vitamins (vitamin b complex) capsule capsule Take  by mouth Daily.      B-D 3CC LUER-PRABHAKAR SYR 22GX1\" 22G X 1\" 3 ML misc INJECT TESTOSTERONE AS DIRECTED      multivitamin (THERAGRAN) tablet tablet        No facility-administered medications prior to visit.     No opioid medication identified on active medication list. I have reviewed chart for other potential  high risk medication/s and harmful drug interactions in the elderly.      Aspirin is on active medication list. Aspirin use is indicated based on review of current medical condition/s. Pros and cons of this therapy have been discussed today. Benefits of this medication outweigh potential harm.  Patient has been encouraged to continue taking this medication.  .      Patient Active Problem List   Diagnosis    Primary osteoarthritis of right hip    Status post left hip replacement    Essential (primary) hypertension    Mixed hyperlipidemia    Seminoma    Clinical diagnosis of severe acute respiratory syndrome coronavirus 2 (SARS-CoV-2) disease    Herpes simplex infection of penis     Advance Care Planning Advance Directive is not on file.  ACP discussion was held with the patient during this visit. Patient does not have an advance directive, information provided.            Objective   Vitals:    12/05/24 1347   BP: 119/65   Pulse: 77   SpO2: 99%   Weight: 77.6 kg (171 lb)   Height: 165.1 cm (65\")       Estimated body mass index is " "28.46 kg/m² as calculated from the following:    Height as of this encounter: 165.1 cm (65\").    Weight as of this encounter: 77.6 kg (171 lb).            Does the patient have evidence of cognitive impairment? No  Lab Results   Component Value Date    CHLPL 130 10/02/2024    HDL 71 10/02/2024    LDL 51 10/02/2024                                                                                                Health  Risk Assessment    Smoking Status:  Social History     Tobacco Use   Smoking Status Never   Smokeless Tobacco Never   Tobacco Comments    none     Alcohol Consumption:  Social History     Substance and Sexual Activity   Alcohol Use Yes    Alcohol/week: 2.0 standard drinks of alcohol    Types: 1 Cans of beer, 1 Drinks containing 0.5 oz of alcohol per week    Comment: Do not drink every week       Fall Risk Screen  TRISTANADI Fall Risk Assessment was completed, and patient is at HIGH risk for falls. Assessment completed on:2024    Depression Screening   Little interest or pleasure in doing things? Not at all   Feeling down, depressed, or hopeless? Not at all   PHQ-2 Total Score 0      Health Habits and Functional and Cognitive Screenin/3/2024    10:06 AM   Functional & Cognitive Status   Do you have difficulty preparing food and eating? No    Do you have difficulty bathing yourself, getting dressed or grooming yourself? No    Do you have difficulty using the toilet? No    Do you have difficulty moving around from place to place? No    Do you have trouble with steps or getting out of a bed or a chair? No    Current Diet Well Balanced Diet    Dental Exam Up to date    Eye Exam Up to date    Exercise (times per week) 3 times per week    Current Exercises Include Cardiovascular Workout;Home Fitness Gym;House Cleaning;Weightlifting    Do you need help using the phone?  No    Are you deaf or do you have serious difficulty hearing?  No    Do you need help to go to places out of walking distance? No  "   Do you need help shopping? No    Do you need help preparing meals?  No    Do you need help with housework?  No    Do you need help with laundry? No    Do you need help taking your medications? No    Do you need help managing money? No    Do you ever drive or ride in a car without wearing a seat belt? No    Have you felt unusual stress, anger or loneliness in the last month? No    Who do you live with? Spouse    If you need help, do you have trouble finding someone available to you? No    Have you been bothered in the last four weeks by sexual problems? No    Do you have difficulty concentrating, remembering or making decisions? No        Patient-reported           Age-appropriate Screening Schedule:  Refer to the list below for future screening recommendations based on patient's age, sex and/or medical conditions. Orders for these recommended tests are listed in the plan section. The patient has been provided with a written plan.    Health Maintenance List  Health Maintenance   Topic Date Due    Pneumococcal Vaccine 65+ (2 of 2 - PCV) 04/20/2022    INFLUENZA VACCINE  07/01/2024    COVID-19 Vaccine (5 - 2024-25 season) 09/01/2024    BMI FOLLOWUP  01/09/2025    LIPID PANEL  10/02/2025    ANNUAL WELLNESS VISIT  12/05/2025    TDAP/TD VACCINES (3 - Td or Tdap) 07/09/2029    HEPATITIS C SCREENING  Completed    ZOSTER VACCINE  Completed    COLOGUARD  Discontinued                                                                                                                                                CMS Preventative Services Quick Reference  Risk Factors Identified During Encounter  Immunizations Discussed/Encouraged: Influenza, Prevnar 20 (Pneumococcal 20-valent conjugate), Shingrix, COVID19, and RSV (Respiratory Syncytial Virus)  Dental Screening Recommended  Vision Screening Recommended    The above risks/problems have been discussed with the patient.  Pertinent information has been shared with the patient in the  "After Visit Summary.  An After Visit Summary and PPPS were made available to the patient.    Follow Up:   Next Medicare Wellness visit to be scheduled in 1 year.         Additional E&M Note during same encounter follows:  Patient has additional, significant, and separately identifiable condition(s)/problem(s) that require work above and beyond the Medicare Wellness Visit     Chief Complaint  Medicare Wellness-subsequent    Subjective   HPI  Roney is also being seen today for an annual adult preventative physical exam.  and Roney is also being seen today for follow-up of his chronic health conditions:    Panic attacks.  He takes the occasional lorazepam as needed.  He is doing well.    Hyperlipidemia.  Patient takes Crestor 10 mg daily.    Hypertension.  Patient is currently taking amlodipine 10 mg daily.  BP's are well controlled.            Objective   Vital Signs:  /65   Pulse 77   Ht 165.1 cm (65\")   Wt 77.6 kg (171 lb)   SpO2 99%   BMI 28.46 kg/m²   Physical Exam  Vitals and nursing note reviewed.   Constitutional:       Appearance: Normal appearance. He is well-developed.   HENT:      Head: Normocephalic and atraumatic.   Eyes:      Conjunctiva/sclera: Conjunctivae normal.   Cardiovascular:      Rate and Rhythm: Normal rate and regular rhythm.      Heart sounds: Normal heart sounds.   Pulmonary:      Effort: Pulmonary effort is normal.      Breath sounds: Normal breath sounds.   Musculoskeletal:         General: Normal range of motion.      Cervical back: Normal range of motion and neck supple.      Right lower leg: No edema.      Left lower leg: No edema.   Skin:     General: Skin is warm and dry.      Capillary Refill: Capillary refill takes less than 2 seconds.      Findings: No rash.   Neurological:      Mental Status: He is alert and oriented to person, place, and time.   Psychiatric:         Mood and Affect: Mood normal.         Behavior: Behavior normal.         Thought Content: Thought content " normal.         Judgment: Judgment normal.         The following data was reviewed by: Anna Ragland DO on 12/05/2024:    Common labs          1/30/2024    08:28 2/6/2024    11:35 10/2/2024    09:52   Common Labs   Glucose   100       BUN   14       Creatinine   1.4       EGFR Non  Am   51       EGFR  Am   61       Sodium   137       Potassium   4.3       Calcium   9.6       ALT (SGPT)   38       WBC 5.61         Hemoglobin 16.5         Hematocrit 48.5         Platelets 225         Total Cholesterol  162     130       Triglycerides  133        HDL Cholesterol  66     71       LDL Cholesterol    51          Details          This result is from an external source.                       Assessment and Plan            Medicare annual wellness visit, subsequent         Panic attacks         Motion sickness, initial encounter    Orders:    Scopolamine (Transderm-Scop, 1.5 MG,) 1 MG/3DAYS patch; Place 1 patch on the skin as directed by provider Every 72 (Seventy-Two) Hours.    Encounter for long-term (current) use of medications    Orders:    Drug Profile [id number] - Urine, Clean Catch    Comprehensive Metabolic Panel    CBC & Differential    TSH    Mixed hyperlipidemia       Orders:    Lipid Panel    Primary hypertension      Orders:    Comprehensive Metabolic Panel     RHM.  Urology is monitoring PSA's (been good).  Colonoscopy should be in the next month or so ([facility]).  Lipids today.    Patient is also here to follow-up on chronic stable hypertension, panic attacks and hyperlipidemia.  Surveillance labs were obtained today and any medication changes will be made based on lab results and will be called to the patient later this week.        Follow Up   Return in about 1 year (around 12/5/2025) for Medicare Wellness, HTN.  Patient was given instructions and counseling regarding his condition or for health maintenance advice. Please see specific information pulled into the AVS if appropriate.

## 2024-12-06 LAB
ACCEPTABLE CREAT UR QL: 146.7 MG/DL (ref 20–300)
ALBUMIN SERPL-MCNC: 4.7 G/DL (ref 3.9–4.9)
ALP SERPL-CCNC: 81 IU/L (ref 44–121)
ALT SERPL-CCNC: 31 IU/L (ref 0–44)
AMPHETAMINES UR QL SCN: NEGATIVE NG/ML
AST SERPL-CCNC: 27 IU/L (ref 0–40)
BARBITURATES UR QL: NEGATIVE NG/ML
BASOPHILS # BLD AUTO: 0 X10E3/UL (ref 0–0.2)
BASOPHILS NFR BLD AUTO: 1 %
BENZODIAZ UR QL SCN: NEGATIVE NG/ML
BILIRUB SERPL-MCNC: 0.5 MG/DL (ref 0–1.2)
BUN SERPL-MCNC: 22 MG/DL (ref 8–27)
BUN/CREAT SERPL: 18 (ref 10–24)
BZE UR QL: NEGATIVE NG/ML
CALCIUM SERPL-MCNC: 9.6 MG/DL (ref 8.6–10.2)
CANNABINOIDS UR QL SCN: NEGATIVE NG/ML
CHLORIDE SERPL-SCNC: 102 MMOL/L (ref 96–106)
CHOLEST SERPL-MCNC: 135 MG/DL (ref 100–199)
CO2 SERPL-SCNC: 24 MMOL/L (ref 20–29)
CREAT SERPL-MCNC: 1.23 MG/DL (ref 0.76–1.27)
EGFRCR SERPLBLD CKD-EPI 2021: 64 ML/MIN/1.73
EOSINOPHIL # BLD AUTO: 0.1 X10E3/UL (ref 0–0.4)
EOSINOPHIL NFR BLD AUTO: 1 %
ERYTHROCYTE [DISTWIDTH] IN BLOOD BY AUTOMATED COUNT: 13.1 % (ref 11.6–15.4)
ETHANOL UR-MCNC: NEGATIVE %
GLOBULIN SER CALC-MCNC: 2.4 G/DL (ref 1.5–4.5)
GLUCOSE SERPL-MCNC: 100 MG/DL (ref 70–99)
HCT VFR BLD AUTO: 47.6 % (ref 37.5–51)
HDLC SERPL-MCNC: 61 MG/DL
HGB BLD-MCNC: 15.6 G/DL (ref 13–17.7)
IMM GRANULOCYTES # BLD AUTO: 0 X10E3/UL (ref 0–0.1)
IMM GRANULOCYTES NFR BLD AUTO: 0 %
LDLC SERPL CALC-MCNC: 55 MG/DL (ref 0–99)
LYMPHOCYTES # BLD AUTO: 1.5 X10E3/UL (ref 0.7–3.1)
LYMPHOCYTES NFR BLD AUTO: 19 %
MCH RBC QN AUTO: 28.4 PG (ref 26.6–33)
MCHC RBC AUTO-ENTMCNC: 32.8 G/DL (ref 31.5–35.7)
MCV RBC AUTO: 87 FL (ref 79–97)
MEPERIDINE UR QL: NEGATIVE NG/ML
METHADONE UR QL: NEGATIVE NG/ML
MONOCYTES # BLD AUTO: 0.6 X10E3/UL (ref 0.1–0.9)
MONOCYTES NFR BLD AUTO: 8 %
NEUTROPHILS # BLD AUTO: 5.4 X10E3/UL (ref 1.4–7)
NEUTROPHILS NFR BLD AUTO: 71 %
OPIATES UR QL SCN: NEGATIVE NG/ML
OXYCODONE+OXYMORPHONE UR QL SCN: NEGATIVE NG/ML
PCP UR QL: NEGATIVE NG/ML
PLATELET # BLD AUTO: 261 X10E3/UL (ref 150–450)
POTASSIUM SERPL-SCNC: 4.6 MMOL/L (ref 3.5–5.2)
PROPOXYPH UR QL: NEGATIVE NG/ML
PROT SERPL-MCNC: 7.1 G/DL (ref 6–8.5)
RBC # BLD AUTO: 5.5 X10E6/UL (ref 4.14–5.8)
SODIUM SERPL-SCNC: 142 MMOL/L (ref 134–144)
TRAMADOL UR QL SCN: NEGATIVE NG/ML
TRIGL SERPL-MCNC: 103 MG/DL (ref 0–149)
TSH SERPL DL<=0.005 MIU/L-ACNC: 1.27 UIU/ML (ref 0.45–4.5)
VLDLC SERPL CALC-MCNC: 19 MG/DL (ref 5–40)
WBC # BLD AUTO: 7.6 X10E3/UL (ref 3.4–10.8)

## 2025-01-03 ENCOUNTER — PREP FOR SURGERY (OUTPATIENT)
Dept: OTHER | Facility: HOSPITAL | Age: 70
End: 2025-01-03
Payer: MEDICARE

## 2025-01-03 DIAGNOSIS — Z12.11 SCREEN FOR COLON CANCER: Primary | ICD-10-CM

## 2025-02-01 DIAGNOSIS — E78.2 MIXED HYPERLIPIDEMIA: ICD-10-CM

## 2025-02-03 RX ORDER — ROSUVASTATIN CALCIUM 10 MG/1
10 TABLET, COATED ORAL DAILY
Qty: 90 TABLET | Refills: 3 | Status: SHIPPED | OUTPATIENT
Start: 2025-02-03

## 2025-02-03 NOTE — TELEPHONE ENCOUNTER
Rx Refill Note  Requested Prescriptions     Pending Prescriptions Disp Refills    rosuvastatin (CRESTOR) 10 MG tablet [Pharmacy Med Name: rosuvastatin 10 mg tablet] 90 tablet 1     Sig: Take 1 tablet by mouth Daily.      Last office visit with prescribing clinician: 12/5/2024   Last telemedicine visit with prescribing clinician: Visit date not found   Next office visit with prescribing clinician: 12/9/2025                         Would you like a call back once the refill request has been completed: [] Yes [] No    If the office needs to give you a call back, can they leave a voicemail: [] Yes [] No    Jessika Clark MA  02/03/25, 07:36 EST

## 2025-02-06 ENCOUNTER — OFFICE VISIT (OUTPATIENT)
Dept: FAMILY MEDICINE CLINIC | Facility: CLINIC | Age: 70
End: 2025-02-06
Payer: MEDICARE

## 2025-02-06 VITALS
DIASTOLIC BLOOD PRESSURE: 72 MMHG | WEIGHT: 171 LBS | HEIGHT: 65 IN | SYSTOLIC BLOOD PRESSURE: 134 MMHG | HEART RATE: 60 BPM | BODY MASS INDEX: 28.49 KG/M2 | OXYGEN SATURATION: 99 %

## 2025-02-06 DIAGNOSIS — M48.061 SPINAL STENOSIS OF LUMBAR REGION WITHOUT NEUROGENIC CLAUDICATION: ICD-10-CM

## 2025-02-06 DIAGNOSIS — H65.01 NON-RECURRENT ACUTE SEROUS OTITIS MEDIA OF RIGHT EAR: Primary | ICD-10-CM

## 2025-02-06 PROCEDURE — 3078F DIAST BP <80 MM HG: CPT | Performed by: FAMILY MEDICINE

## 2025-02-06 PROCEDURE — 3075F SYST BP GE 130 - 139MM HG: CPT | Performed by: FAMILY MEDICINE

## 2025-02-06 PROCEDURE — 1125F AMNT PAIN NOTED PAIN PRSNT: CPT | Performed by: FAMILY MEDICINE

## 2025-02-06 PROCEDURE — 99213 OFFICE O/P EST LOW 20 MIN: CPT | Performed by: FAMILY MEDICINE

## 2025-02-06 PROCEDURE — 1160F RVW MEDS BY RX/DR IN RCRD: CPT | Performed by: FAMILY MEDICINE

## 2025-02-06 PROCEDURE — 1159F MED LIST DOCD IN RCRD: CPT | Performed by: FAMILY MEDICINE

## 2025-02-06 RX ORDER — IPRATROPIUM BROMIDE 42 UG/1
2 SPRAY, METERED NASAL 4 TIMES DAILY
Qty: 15 ML | Refills: 0 | Status: SHIPPED | OUTPATIENT
Start: 2025-02-06

## 2025-02-06 NOTE — PROGRESS NOTES
"Chief Complaint  rt ear pain     Jovon Trinidad presents to [facility]  History of Present Illness  Patient is here today with a new concern.  He has been having right ear pain for the last week or so.  He denies any fevers or chills.  His symptoms were preceded by upper respiratory symptoms a few weeks ago.  He denies any dizziness or hearing loss or ringing in his ears.    Patient is also here for some intermittent low back pain.  He has had evaluation of low back pain in the past and was told that he has some spinal stenosis.  Patient denies any saddle numbness or numbness and tingling in his lower extremities.  However, he does have some intermittent back pain.  He would like to try physical therapy with [facility].      Objective   Vital Signs:  /72   Pulse 60   Ht 165.1 cm (65\")   Wt 77.6 kg (171 lb)   SpO2 99%   BMI 28.46 kg/m²   Estimated body mass index is 28.46 kg/m² as calculated from the following:    Height as of this encounter: 165.1 cm (65\").    Weight as of this encounter: 77.6 kg (171 lb).          Physical Exam  Vitals and nursing note reviewed.   Constitutional:       Appearance: He is well-developed.   HENT:      Head: Normocephalic and atraumatic.      Right Ear: Ear canal and external ear normal. A middle ear effusion (clear fluid, slightly dull TM) is present.      Left Ear: Tympanic membrane, ear canal and external ear normal.      Nose: Nose normal.   Eyes:      General: No scleral icterus.     Conjunctiva/sclera: Conjunctivae normal.   Cardiovascular:      Rate and Rhythm: Normal rate and regular rhythm.      Heart sounds: Normal heart sounds.   Pulmonary:      Effort: Pulmonary effort is normal.      Breath sounds: Normal breath sounds.   Musculoskeletal:      Cervical back: Normal range of motion and neck supple.      Right lower leg: No edema.      Left lower leg: No edema.   Skin:     General: Skin is warm and dry.      Findings: " No rash.   Neurological:      Mental Status: He is alert and oriented to person, place, and time.   Psychiatric:         Mood and Affect: Mood normal.         Behavior: Behavior normal.         Thought Content: Thought content normal.         Judgment: Judgment normal.        Result Review :                Assessment and Plan   Diagnoses and all orders for this visit:    1. Non-recurrent acute serous otitis media of right ear (Primary)  -     ipratropium (ATROVENT) 0.06 % nasal spray; Administer 2 sprays into the nostril(s) as directed by provider 4 (Four) Times a Day.  Dispense: 15 mL; Refill: 0    2. Spinal stenosis of lumbar region without neurogenic claudication  -     Ambulatory Referral to Physical Therapy for Evaluation & Treatment    Serous otitis media of the right ear.  Advised the patient to use Flonase nasal spray daily and Atrovent as needed until the pressure in his right ear is gone.  If symptoms are worsening or do not improve over the next week he should follow-up.    Referral to physical therapy for low back pain as requested.         Follow Up   Return if symptoms worsen or fail to improve.  Patient was given instructions and counseling regarding his condition or for health maintenance advice. Please see specific information pulled into the AVS if appropriate.

## 2025-02-25 ENCOUNTER — OFFICE VISIT (OUTPATIENT)
Dept: ORTHOPEDIC SURGERY | Facility: CLINIC | Age: 70
End: 2025-02-25
Payer: MEDICARE

## 2025-02-25 VITALS — HEIGHT: 65 IN | BODY MASS INDEX: 29.42 KG/M2 | TEMPERATURE: 98.2 F | WEIGHT: 176.6 LBS

## 2025-02-25 DIAGNOSIS — R52 PAIN: Primary | ICD-10-CM

## 2025-02-25 DIAGNOSIS — M17.12 PRIMARY OSTEOARTHRITIS OF LEFT KNEE: ICD-10-CM

## 2025-02-25 RX ORDER — METHYLPREDNISOLONE ACETATE 80 MG/ML
80 INJECTION, SUSPENSION INTRA-ARTICULAR; INTRALESIONAL; INTRAMUSCULAR; SOFT TISSUE
Status: COMPLETED | OUTPATIENT
Start: 2025-02-25 | End: 2025-02-25

## 2025-02-25 RX ORDER — METHYLENE BLUE 10 MG/ML
INJECTION INTRAVENOUS
COMMUNITY
Start: 2025-02-14

## 2025-02-25 RX ADMIN — METHYLPREDNISOLONE ACETATE 80 MG: 80 INJECTION, SUSPENSION INTRA-ARTICULAR; INTRALESIONAL; INTRAMUSCULAR; SOFT TISSUE at 11:53

## 2025-02-25 NOTE — PROGRESS NOTES
Patient: Roney Trinidad  YOB: 1955 69 y.o. male  Medical Record Number: [id number]    Chief Complaints:   Chief Complaint   Patient presents with   • Left Knee - Follow-up, Injections       History of Present Illness:Roney Trinidad is a 69 y.o. male who presents with complaints of worsening in left knee pain, patient does have a history of osteoarthritis reports over the last several weeks his knee has become more sore he is getting ready to leave for a trip.  Would like to discuss options today    Allergies:   Allergies   Allergen Reactions   • Sulfamethoxazole-Trimethoprim Nausea Only       Medications:   Current Outpatient Medications   Medication Sig Dispense Refill   • acyclovir (ZOVIRAX) 800 MG tablet Take 1 tablet by mouth 3 (Three) Times a Day. 15 tablet 3   • amLODIPine (NORVASC) 10 MG tablet Take 1 tablet by mouth Daily.     • aspirin 81 MG EC tablet Take 1 tablet by mouth Daily.     • Cholecalciferol 250 MCG (29503 UT) capsule Take 10,000 Units by mouth Daily.     • fexofenadine-pseudoephedrine (ALLEGRA-D 24) 180-240 MG per 24 hr tablet Take 1 tablet by mouth Daily.     • fluticasone (VERAMYST) 27.5 MCG/SPRAY nasal spray 2 sprays into the nostril(s) as directed by provider Daily. 26.4 mL 4   • ipratropium (ATROVENT) 0.06 % nasal spray Administer 2 sprays into the nostril(s) as directed by provider 4 (Four) Times a Day. 15 mL 0   • LORazepam (ATIVAN) 0.5 MG tablet Take 1 tablet by mouth Every 8 (Eight) Hours As Needed for Anxiety. 30 tablet 0   • meloxicam (MOBIC) 15 MG tablet Take 1 tablet by mouth Daily. 90 tablet 0   • Methylene Blue 20 MG/2ML solution prefilled syringe      • NON FORMULARY METHYALCARE     • ondansetron (Zofran) 4 MG tablet Take 1 tablet by mouth Every 8 (Eight) Hours As Needed for Nausea or Vomiting. 30 tablet 0   • rosuvastatin (CRESTOR) 10 MG tablet Take 1 tablet by mouth Daily. 90 tablet 3   • Scopolamine (Transderm-Scop, 1.5 MG,) 1 MG/3DAYS patch Place 1 patch on the  "skin as directed by provider Every 72 (Seventy-Two) Hours. 4 patch 2   • sildenafil (REVATIO) 20 MG tablet Take 1 tablet by mouth Daily As Needed (ed). 90 tablet 4   • tadalafil (CIALIS) 5 MG tablet TAKE 1 - 4 tablets BY MOUTH 1 hour prior to sexual activity     • Testosterone Cypionate (DEPOTESTOTERONE CYPIONATE) 200 MG/ML injection INJECT 0.5 INTRAMUSCULARLY EVERY WEEK     • VITAMIN D-VITAMIN K PO Take  by mouth.     • zinc sulfate (ZINCATE) 220 (50 Zn) MG capsule Take 1 capsule by mouth Daily.       No current facility-administered medications for this visit.         The following portions of the patient's history were reviewed and updated as appropriate: allergies, current medications, past family history, past medical history, past social history, past surgical history and problem list.    Review of Systems:   14 point review of systems was performed. All systems negative except pertinent positives/negatives listed in HPI above    Physical Exam:   Vitals:    02/25/25 1119   Temp: 98.2 °F (36.8 °C)   Weight: 80.1 kg (176 lb 9.6 oz)   Height: 165.1 cm (65\")       General: A and O x 3, ASA, NAD   Skin clear no unusual lesions noted  Left knee the patient has no appreciable effusion 120 degrees flexion neutral in extension with a mildly positive Chuy negative Lockman calf soft and nontender       Radiology:  Xrays 3views (ap,lateral, sunrise) left knee were ordered and reviewed today secondary to increased pain show bone-on-bone end-stage osteoarthritis with cyst and spur formation.  Compared to views show definite progression in arthritic changes    Assessment/Plan: End-stage osteoarthritis left knee with increasing pain    Patient and I discussed options including conservative measures such as injection, medications, versus total knee replacement, risk benefits alternatives discussed he would like to continue with conservative measures at this time we will proceed with left knee cortisone injection, I will " see him back for follow-up if and when his symptoms worsen we can further discuss surgical intervention    Large Joint Arthrocentesis: L knee  Date/Time: 2/25/2025 11:53 AM  Consent given by: patient  Site marked: site marked  Timeout: Immediately prior to procedure a time out was called to verify the correct patient, procedure, equipment, support staff and site/side marked as required   Supporting Documentation  Indications: pain and joint swelling   Procedure Details  Location: knee - L knee  Preparation: Patient was prepped and draped in the usual sterile fashion  Needle size: 22 G  Approach: anterolateral  Medications administered: 80 mg methylPREDNISolone acetate 80 MG/ML; 2 mL lidocaine (cardiac)  Patient tolerance: patient tolerated the procedure well with no immediate complications           Darya Li, APRN  2/25/2025

## 2025-02-27 DIAGNOSIS — A60.00 GENITAL HERPES SIMPLEX, UNSPECIFIED SITE: ICD-10-CM

## 2025-02-27 DIAGNOSIS — M25.562 ACUTE PAIN OF LEFT KNEE: ICD-10-CM

## 2025-02-27 RX ORDER — MELOXICAM 15 MG/1
15 TABLET ORAL DAILY
Qty: 90 TABLET | Refills: 0 | Status: SHIPPED | OUTPATIENT
Start: 2025-02-27

## 2025-02-27 RX ORDER — ACYCLOVIR 800 MG/1
800 TABLET ORAL 3 TIMES DAILY
Qty: 15 TABLET | Refills: 0 | Status: SHIPPED | OUTPATIENT
Start: 2025-02-27

## 2025-03-04 DIAGNOSIS — F41.0 PANIC ATTACKS: ICD-10-CM

## 2025-03-04 RX ORDER — LORAZEPAM 0.5 MG/1
0.5 TABLET ORAL EVERY 8 HOURS PRN
Qty: 30 TABLET | Refills: 0 | Status: SHIPPED | OUTPATIENT
Start: 2025-03-04

## 2025-03-04 NOTE — TELEPHONE ENCOUNTER
Rx Refill Note  Requested Prescriptions     Pending Prescriptions Disp Refills    LORazepam (ATIVAN) 0.5 MG tablet [Pharmacy Med Name: lorazepam 0.5 mg tablet] 30 tablet 0     Sig: Take 1 tablet by mouth Every 8 (Eight) Hours As Needed for Anxiety.      Last office visit with prescribing clinician: 2/6/2025   Last telemedicine visit with prescribing clinician: Visit date not found   Next office visit with prescribing clinician: 12/9/2025                         Would you like a call back once the refill request has been completed: [] Yes [] No    If the office needs to give you a call back, can they leave a voicemail: [] Yes [] No    Jessika Clark MA  03/04/25, 11:19 EST

## 2025-05-02 DIAGNOSIS — A60.00 GENITAL HERPES SIMPLEX, UNSPECIFIED SITE: ICD-10-CM

## 2025-05-02 RX ORDER — ACYCLOVIR 800 MG/1
800 TABLET ORAL 3 TIMES DAILY
Qty: 15 TABLET | Refills: 0 | Status: SHIPPED | OUTPATIENT
Start: 2025-05-02

## 2025-05-06 ENCOUNTER — TELEPHONE (OUTPATIENT)
Dept: ORTHOPEDIC SURGERY | Facility: CLINIC | Age: 70
End: 2025-05-06

## 2025-05-06 NOTE — TELEPHONE ENCOUNTER
Caller: Roney Trinidad    Relationship to patient: Self    Best call back number:   Telephone Information:   Mobile [phone]       Chief complaint: LEFT KNEE PAIN - LAST INJECTION 2/25/25    Type of visit: CORTISONE INJECTION    Requested date: 6/2/25-6/6/25    Additional notes: THE NEXT AVAILABLE IS 6/9/25 WHICH IS WHEN THE PATIENT LEAVES FOR VACATION

## 2025-05-21 ENCOUNTER — ANESTHESIA (OUTPATIENT)
Dept: GASTROENTEROLOGY | Facility: HOSPITAL | Age: 70
End: 2025-05-21
Payer: MEDICARE

## 2025-05-21 ENCOUNTER — HOSPITAL ENCOUNTER (OUTPATIENT)
Facility: HOSPITAL | Age: 70
Setting detail: HOSPITAL OUTPATIENT SURGERY
Discharge: HOME | End: 2025-05-21
Attending: SURGERY | Admitting: SURGERY
Payer: MEDICARE

## 2025-05-21 ENCOUNTER — ANESTHESIA EVENT (OUTPATIENT)
Dept: GASTROENTEROLOGY | Facility: HOSPITAL | Age: 70
End: 2025-05-21
Payer: MEDICARE

## 2025-05-21 VITALS
DIASTOLIC BLOOD PRESSURE: 64 MMHG | OXYGEN SATURATION: 99 % | WEIGHT: 174.6 LBS | BODY MASS INDEX: 29.09 KG/M2 | HEIGHT: 65 IN | SYSTOLIC BLOOD PRESSURE: 110 MMHG | RESPIRATION RATE: 15 BRPM | HEART RATE: 55 BPM

## 2025-05-21 PROCEDURE — 25810000003 LACTATED RINGERS PER 1000 ML: Performed by: SURGERY

## 2025-05-21 PROCEDURE — 25010000002 PROPOFOL 10 MG/ML EMULSION: Performed by: NURSE ANESTHETIST, CERTIFIED REGISTERED

## 2025-05-21 PROCEDURE — G0121 COLON CA SCRN NOT HI RSK IND: HCPCS | Performed by: SURGERY

## 2025-05-21 PROCEDURE — S0260 H&P FOR SURGERY: HCPCS | Performed by: SURGERY

## 2025-05-21 RX ORDER — SODIUM CHLORIDE 0.9 % (FLUSH) 0.9 %
10 SYRINGE (ML) INJECTION AS NEEDED
Status: DISCONTINUED | OUTPATIENT
Start: 2025-05-21 | End: 2025-05-21 | Stop reason: HOSPADM

## 2025-05-21 RX ORDER — PROPOFOL 10 MG/ML
VIAL (ML) INTRAVENOUS AS NEEDED
Status: DISCONTINUED | OUTPATIENT
Start: 2025-05-21 | End: 2025-05-21 | Stop reason: SURG

## 2025-05-21 RX ORDER — SODIUM CHLORIDE, SODIUM LACTATE, POTASSIUM CHLORIDE, CALCIUM CHLORIDE 600; 310; 30; 20 MG/100ML; MG/100ML; MG/100ML; MG/100ML
1000 INJECTION, SOLUTION INTRAVENOUS CONTINUOUS
Status: DISCONTINUED | OUTPATIENT
Start: 2025-05-21 | End: 2025-05-21 | Stop reason: HOSPADM

## 2025-05-21 RX ADMIN — PROPOFOL 100 MG: 10 INJECTION, EMULSION INTRAVENOUS at 08:17

## 2025-05-21 RX ADMIN — SODIUM CHLORIDE, POTASSIUM CHLORIDE, SODIUM LACTATE AND CALCIUM CHLORIDE 1000 ML: 600; 310; 30; 20 INJECTION, SOLUTION INTRAVENOUS at 07:54

## 2025-05-21 RX ADMIN — PROPOFOL 200 MCG/KG/MIN: 10 INJECTION, EMULSION INTRAVENOUS at 08:17

## 2025-05-21 NOTE — OP NOTE
PREOPERATIVE DIAGNOSIS:  Screening    POSTOPERATIVE DIAGNOSIS AND FINDINGS:  Scattered diverticulosis    PROCEDURE:  Colonoscopy to cecum     SURGEON:  Handy Arita MD    ANESTHESIA:  MAC    SPECIMEN(S):  none    DESCRIPTION:  In decubitus position digital rectal exam was normal. Colonoscope inserted under direct visualization of lumen to cecum confirmed by visualization of ileocecal valve and appendiceal orifice.  Scope slowly drawn circumferentially examining all mucosal surfaces.  Bowel preparation was good.  There were no mucosal abnormalities.  Minimal scattered diverticulosis noted.  Tolerated well.    RECOMMENDATION FOR FUTURE SURVEILLANCE:  10 years    Handy Arita M.D.

## 2025-05-21 NOTE — DISCHARGE INSTRUCTIONS
For the next 24 hours patient needs to be with a responsible adult.    For THE REST OF TODAY DO NOT drive, operate machinery, appliances, drink alcohol, make important decisions or sign legal documents.    Start with a light or bland diet if you are feeling sick to your stomach otherwise advance to regular diet as tolerated.    Follow recommendations on procedure report if provided by your doctor.    Call Dr Arita for problems [phone]     Problems may include but not limited to: large amounts of bleeding, trouble breathing, repeated vomiting, severe unrelieved pain, fever or chills.      If biopsies or polyps were taken, MD will call you with the results in about 7 days. If you don't hear from the MD in 2 weeks, call the number above.

## 2025-05-21 NOTE — H&P
CC: Screening    HPI: 70-year-old gentleman presents for screening colonoscopy, no personal history of polyps, no family history of colon cancer    PMH, PSH, MEDS AND ALLERGIES reviewed and reconciled in  EPIC    PHYSICAL EXAM:  Constitutional:  awake, alert, no acute distress  VS: afebrile, VSS  Respiratory:  normal inspiratory effort  Cardiovascular: regular rate  Gastrointestinal: Soft    ROS:  relevant systems negative other than any presenting complaints    ASSESSMENT:    Screening    PLAN:  Colonoscopy    Handy Arita M.D.

## 2025-05-21 NOTE — ANESTHESIA PREPROCEDURE EVALUATION
Anesthesia Evaluation     Patient summary reviewed and Nursing notes reviewed                Airway   Mallampati: II  Neck ROM: limited  Dental      Pulmonary    (+) ,sleep apnea  Cardiovascular     ECG reviewed  Rhythm: regular  Rate: normal    (+) hypertension, hyperlipidemia      Neuro/Psych  (+) numbness, psychiatric history Anxiety and Depression  GI/Hepatic/Renal/Endo    (+) obesity    Musculoskeletal     Abdominal    Substance History   (+) alcohol use     OB/GYN negative ob/gyn ROS         Other   arthritis,   history of cancer                  Anesthesia Plan    ASA 3     MAC     intravenous induction     Anesthetic plan, risks, benefits, and alternatives have been provided, discussed and informed consent has been obtained with: patient.    CODE STATUS:         
Yes

## 2025-05-21 NOTE — ANESTHESIA POSTPROCEDURE EVALUATION
Patient: Roney Trinidad    Procedure Summary       Date: 05/21/25 Room / Location: [facility] ENDOSCOPY 1 / [facility] ENDOSCOPY    Anesthesia Start: 0814 Anesthesia Stop: 0834    Procedure: COLONOSCOPY TO CECUM Diagnosis:       Screen for colon cancer      (Screen for colon cancer [Z12.11])    Surgeons: Handy Arita MD Provider: Brooks Gallegos MD    Anesthesia Type: MAC ASA Status: 3            Anesthesia Type: MAC    Vitals  Vitals Value Taken Time   BP 94/61 05/21/25 08:33   Temp     Pulse 56 05/21/25 08:38   Resp 12 05/21/25 08:32   SpO2 96 % 05/21/25 08:38   Vitals shown include unfiled device data.        Post Anesthesia Care and Evaluation    Patient location during evaluation: PACU  Patient participation: complete - patient participated  Level of consciousness: awake and alert  Pain management: adequate    Airway patency: patent  Anesthetic complications: No anesthetic complications    Cardiovascular status: acceptable  Respiratory status: acceptable  Hydration status: acceptable    Comments: --------------------            05/21/25               0832     --------------------   BP:       94/61      Pulse:      59       Resp:       12       SpO2:      98%      --------------------

## 2025-06-03 ENCOUNTER — PREP FOR SURGERY (OUTPATIENT)
Dept: OTHER | Facility: HOSPITAL | Age: 70
End: 2025-06-03
Payer: MEDICARE

## 2025-06-03 ENCOUNTER — OFFICE VISIT (OUTPATIENT)
Dept: ORTHOPEDIC SURGERY | Facility: CLINIC | Age: 70
End: 2025-06-03
Payer: MEDICARE

## 2025-06-03 VITALS — TEMPERATURE: 98.2 F | WEIGHT: 174.8 LBS | BODY MASS INDEX: 29.12 KG/M2 | HEIGHT: 65 IN

## 2025-06-03 DIAGNOSIS — M17.12 PRIMARY OSTEOARTHRITIS OF LEFT KNEE: Primary | ICD-10-CM

## 2025-06-03 RX ORDER — HYDROCODONE BITARTRATE AND ACETAMINOPHEN 5; 325 MG/1; MG/1
TABLET ORAL
COMMUNITY
Start: 2025-04-22

## 2025-06-03 RX ORDER — METHYLPREDNISOLONE ACETATE 80 MG/ML
80 INJECTION, SUSPENSION INTRA-ARTICULAR; INTRALESIONAL; INTRAMUSCULAR; SOFT TISSUE
Status: COMPLETED | OUTPATIENT
Start: 2025-06-03 | End: 2025-06-03

## 2025-06-03 RX ORDER — B-COMPLEX WITH VITAMIN C
TABLET ORAL
COMMUNITY

## 2025-06-03 RX ORDER — ACYCLOVIR 800 MG/1
1 TABLET ORAL 3 TIMES DAILY
COMMUNITY
Start: 2025-05-02

## 2025-06-03 RX ORDER — MELOXICAM 15 MG/1
15 TABLET ORAL ONCE
OUTPATIENT
Start: 2025-06-03 | End: 2025-06-03

## 2025-06-03 RX ORDER — PREGABALIN 75 MG/1
150 CAPSULE ORAL ONCE
OUTPATIENT
Start: 2025-06-03 | End: 2025-06-03

## 2025-06-03 RX ORDER — CHLORHEXIDINE GLUCONATE 500 MG/1
CLOTH TOPICAL 2 TIMES DAILY
OUTPATIENT
Start: 2025-06-03

## 2025-06-03 RX ADMIN — METHYLPREDNISOLONE ACETATE 80 MG: 80 INJECTION, SUSPENSION INTRA-ARTICULAR; INTRALESIONAL; INTRAMUSCULAR; SOFT TISSUE at 10:08

## 2025-06-03 NOTE — PROGRESS NOTES
"Patient: Roney Trinidad  YOB: 1955 70 y.o. male  Medical Record Number: [id number]    Chief Complaints:   Chief Complaint   Patient presents with    Left Knee - Follow-up       History of Present Illness:Roney Trinidad is a 70 y.o. male who presents with complaints of worsening in left knee pain.  He has tried and failed conservative measures.  He would like to discuss options today    Allergies:   Allergies   Allergen Reactions    Sulfamethoxazole-Trimethoprim Nausea Only       Medications:   Current Outpatient Medications   Medication Sig Dispense Refill    acyclovir (ZOVIRAX) 800 MG tablet Take 1 tablet by mouth 3 (Three) Times a Day. 15 tablet 0    acyclovir (ZOVIRAX) 800 MG tablet Take 1 tablet by mouth 3 (Three) Times a Day.      amLODIPine (NORVASC) 10 MG tablet Take 1 tablet by mouth Daily.      aspirin 81 MG EC tablet Take 1 tablet by mouth Daily.      Cholecalciferol 250 MCG (30265 UT) capsule Take 10,000 Units by mouth Daily.      cyanocobalamin (VITAMIN B-12) 500 MCG tablet Take 1 tablet by mouth Daily.      fexofenadine-pseudoephedrine (ALLEGRA-D 24) 180-240 MG per 24 hr tablet Take 1 tablet by mouth Daily.      fluticasone (VERAMYST) 27.5 MCG/SPRAY nasal spray 2 sprays into the nostril(s) as directed by provider Daily. 26.4 mL 4    HYDROcodone-acetaminophen (NORCO) 5-325 MG per tablet       ipratropium (ATROVENT) 0.06 % nasal spray Administer 2 sprays into the nostril(s) as directed by provider 4 (Four) Times a Day. 15 mL 0    LORazepam (ATIVAN) 0.5 MG tablet Take 1 tablet by mouth Every 8 (Eight) Hours As Needed for Anxiety. 30 tablet 0    Luer Lock Safety Syringes 22G X 1\" 3 ML misc USE AS DIRECTED FOR testosterone      meloxicam (MOBIC) 15 MG tablet Take 1 tablet by mouth Daily. 90 tablet 0    Methylene Blue 20 MG/2ML solution prefilled syringe       niacin 50 MG tablet Take  by mouth.      NON FORMULARY METHYALCARE      rosuvastatin (CRESTOR) 10 MG tablet Take 1 tablet by mouth " "Daily. 90 tablet 3    Scopolamine (Transderm-Scop, 1.5 MG,) 1 MG/3DAYS patch Place 1 patch on the skin as directed by provider Every 72 (Seventy-Two) Hours. 4 patch 2    sildenafil (REVATIO) 20 MG tablet Take 1 tablet by mouth Daily As Needed (ed). 90 tablet 4    tadalafil (CIALIS) 5 MG tablet TAKE 1 - 4 tablets BY MOUTH 1 hour prior to sexual activity      Testosterone Cypionate (DEPOTESTOTERONE CYPIONATE) 200 MG/ML injection INJECT 0.5 INTRAMUSCULARLY EVERY WEEK      VITAMIN D-VITAMIN K PO Take  by mouth.      zinc sulfate (ZINCATE) 220 (50 Zn) MG capsule Take 1 capsule by mouth Daily.       No current facility-administered medications for this visit.         The following portions of the patient's history were reviewed and updated as appropriate: allergies, current medications, past family history, past medical history, past social history, past surgical history and problem list.    Review of Systems:   14 point review of systems was performed. All systems negative except pertinent positives/negatives listed in HPI above    Physical Exam:   Vitals:    06/03/25 0952   Temp: 98.2 °F (36.8 °C)   Weight: 79.3 kg (174 lb 12.8 oz)   Height: 165.1 cm (65\")       General: A and O x 3, ASA, NAD   Skin clear no unusual lesions noted  Left knee patient has trace amount of effusion noted with limited range of motion secondary to pain he does walk with an antalgic gait       Radiology:  Xrays 3views (ap,lateral, sunrise) is x-rays of the left knee were reviewed show bone-on-bone end-stage osteoarthritis with cyst and spur formation    Assessment/Plan: End-stage osteoarthritis left knee with increasing pain    Patient and I discussed options including continuing with conservative measures versus left total knee replacement.  Patient would like to proceed with left knee cortisone injection today even though does not last for very long would also like to proceed with scheduling left total knee replacement same day home health in " approximately 3 to 4 months.    Large Joint Arthrocentesis: L knee  Date/Time: 6/3/2025 10:08 AM  Consent given by: patient  Site marked: site marked  Timeout: Immediately prior to procedure a time out was called to verify the correct patient, procedure, equipment, support staff and site/side marked as required   Supporting Documentation  Indications: pain and joint swelling   Procedure Details  Location: knee - L knee  Preparation: Patient was prepped and draped in the usual sterile fashion  Needle size: 22 G  Approach: anterolateral  Medications administered: 80 mg methylPREDNISolone acetate 80 MG/ML; 2 mL lidocaine (cardiac)  Patient tolerance: patient tolerated the procedure well with no immediate complications      Continuation of conservative management vs. TKA discussed.  The patient wishes to proceed with total knee replacement.  At this point the patient has failed the full compliment of conservative treatment and stating complete understanding of the risks/benefits/ anternatives wishes to proceed with surgical treatment.    Risk and benefits of surgery were reviewed.  Including, but not limited to, blood clots or pulmonary embolism, anesthesia risk, infection, fracture, skin/leg numbness, persistent pain/crepitance/popping/catching, failure of the implant, need for future surgeries, hematoma, possible nerve or blood vessel injury, need for transfusion, and potential risk of stroke,heart attack or death, among others.  The patient understands and wishes to proceed.     It was explained that if tissue has been repaired or reconstructed, there is also an increased chance of failure which may require further management.  Following the completion of the discussion, the patient expressed understanding of this planned course of care, all their questions were answered and consent will be obtained preoperatively.    Operative Plan: [facility] Total Knee Replacement performing the procedure on an  outpatient basis with home health rehab     Darya Li, APRN  6/3/2025

## 2025-08-04 DIAGNOSIS — N52.9 ERECTILE DYSFUNCTION, UNSPECIFIED ERECTILE DYSFUNCTION TYPE: ICD-10-CM

## 2025-08-04 DIAGNOSIS — A60.00 GENITAL HERPES SIMPLEX, UNSPECIFIED SITE: ICD-10-CM

## 2025-08-04 RX ORDER — ACYCLOVIR 800 MG/1
800 TABLET ORAL 3 TIMES DAILY
Qty: 15 TABLET | Refills: 4 | Status: SHIPPED | OUTPATIENT
Start: 2025-08-04

## 2025-08-04 RX ORDER — SILDENAFIL CITRATE 20 MG/1
20 TABLET ORAL DAILY
Qty: 90 TABLET | Refills: 4 | Status: SHIPPED | OUTPATIENT
Start: 2025-08-04

## 2025-08-23 DIAGNOSIS — A60.00 GENITAL HERPES SIMPLEX, UNSPECIFIED SITE: ICD-10-CM

## 2025-08-25 RX ORDER — ACYCLOVIR 800 MG/1
800 TABLET ORAL 3 TIMES DAILY
Qty: 15 TABLET | Refills: 4 | Status: SHIPPED | OUTPATIENT
Start: 2025-08-25

## (undated) DEVICE — CANN O2 ETCO2 FITS ALL CONN CO2 SMPL A/ 7IN DISP LF

## (undated) DEVICE — ADAPT CLN BIOGUARD AIR/H2O DISP

## (undated) DEVICE — TUBING, SUCTION, 1/4" X 10', STRAIGHT: Brand: MEDLINE

## (undated) DEVICE — KT ORCA ORCAPOD DISP STRL

## (undated) DEVICE — SENSR O2 OXIMAX FNGR A/ 18IN NONSTR

## (undated) DEVICE — LN SMPL CO2 SHTRM SD STREAM W/M LUER